# Patient Record
Sex: FEMALE | Race: OTHER | NOT HISPANIC OR LATINO | URBAN - METROPOLITAN AREA
[De-identification: names, ages, dates, MRNs, and addresses within clinical notes are randomized per-mention and may not be internally consistent; named-entity substitution may affect disease eponyms.]

---

## 2020-10-02 ENCOUNTER — INPATIENT (INPATIENT)
Facility: HOSPITAL | Age: 65
LOS: 6 days | Discharge: HOME HEALTH | DRG: 219 | End: 2020-10-09
Attending: THORACIC SURGERY (CARDIOTHORACIC VASCULAR SURGERY) | Admitting: THORACIC SURGERY (CARDIOTHORACIC VASCULAR SURGERY)
Payer: COMMERCIAL

## 2020-10-02 VITALS
OXYGEN SATURATION: 100 % | HEART RATE: 99 BPM | DIASTOLIC BLOOD PRESSURE: 59 MMHG | HEIGHT: 60 IN | SYSTOLIC BLOOD PRESSURE: 121 MMHG | WEIGHT: 134.26 LBS | RESPIRATION RATE: 16 BRPM

## 2020-10-02 LAB
A1C WITH ESTIMATED AVERAGE GLUCOSE RESULT: 6.9 % — HIGH (ref 4–5.6)
ALBUMIN SERPL ELPH-MCNC: 3.5 G/DL — SIGNIFICANT CHANGE UP (ref 3.3–5)
ALP SERPL-CCNC: 94 U/L — SIGNIFICANT CHANGE UP (ref 40–120)
ALT FLD-CCNC: 20 U/L — SIGNIFICANT CHANGE UP (ref 10–45)
ANION GAP SERPL CALC-SCNC: 12 MMOL/L — SIGNIFICANT CHANGE UP (ref 5–17)
APTT BLD: 27.6 SEC — SIGNIFICANT CHANGE UP (ref 27.5–35.5)
AST SERPL-CCNC: 19 U/L — SIGNIFICANT CHANGE UP (ref 10–40)
BASOPHILS # BLD AUTO: 0.03 K/UL — SIGNIFICANT CHANGE UP (ref 0–0.2)
BASOPHILS NFR BLD AUTO: 0.3 % — SIGNIFICANT CHANGE UP (ref 0–2)
BILIRUB SERPL-MCNC: 0.8 MG/DL — SIGNIFICANT CHANGE UP (ref 0.2–1.2)
BLD GP AB SCN SERPL QL: NEGATIVE — SIGNIFICANT CHANGE UP
BUN SERPL-MCNC: 22 MG/DL — SIGNIFICANT CHANGE UP (ref 7–23)
CALCIUM SERPL-MCNC: 9.1 MG/DL — SIGNIFICANT CHANGE UP (ref 8.4–10.5)
CHLORIDE SERPL-SCNC: 102 MMOL/L — SIGNIFICANT CHANGE UP (ref 96–108)
CO2 SERPL-SCNC: 27 MMOL/L — SIGNIFICANT CHANGE UP (ref 22–31)
CREAT SERPL-MCNC: 0.72 MG/DL — SIGNIFICANT CHANGE UP (ref 0.5–1.3)
EOSINOPHIL # BLD AUTO: 0.31 K/UL — SIGNIFICANT CHANGE UP (ref 0–0.5)
EOSINOPHIL NFR BLD AUTO: 3.3 % — SIGNIFICANT CHANGE UP (ref 0–6)
ESTIMATED AVERAGE GLUCOSE: 151 MG/DL — HIGH (ref 68–114)
GLUCOSE BLDC GLUCOMTR-MCNC: 107 MG/DL — HIGH (ref 70–99)
GLUCOSE SERPL-MCNC: 105 MG/DL — HIGH (ref 70–99)
HCT VFR BLD CALC: 35.8 % — SIGNIFICANT CHANGE UP (ref 34.5–45)
HGB BLD-MCNC: 11.7 G/DL — SIGNIFICANT CHANGE UP (ref 11.5–15.5)
IMM GRANULOCYTES NFR BLD AUTO: 0.3 % — SIGNIFICANT CHANGE UP (ref 0–1.5)
INR BLD: 1.09 — SIGNIFICANT CHANGE UP (ref 0.88–1.16)
LYMPHOCYTES # BLD AUTO: 1.82 K/UL — SIGNIFICANT CHANGE UP (ref 1–3.3)
LYMPHOCYTES # BLD AUTO: 19.7 % — SIGNIFICANT CHANGE UP (ref 13–44)
MCHC RBC-ENTMCNC: 29.4 PG — SIGNIFICANT CHANGE UP (ref 27–34)
MCHC RBC-ENTMCNC: 32.7 GM/DL — SIGNIFICANT CHANGE UP (ref 32–36)
MCV RBC AUTO: 89.9 FL — SIGNIFICANT CHANGE UP (ref 80–100)
MONOCYTES # BLD AUTO: 0.89 K/UL — SIGNIFICANT CHANGE UP (ref 0–0.9)
MONOCYTES NFR BLD AUTO: 9.6 % — SIGNIFICANT CHANGE UP (ref 2–14)
NEUTROPHILS # BLD AUTO: 6.18 K/UL — SIGNIFICANT CHANGE UP (ref 1.8–7.4)
NEUTROPHILS NFR BLD AUTO: 66.8 % — SIGNIFICANT CHANGE UP (ref 43–77)
NRBC # BLD: 0 /100 WBCS — SIGNIFICANT CHANGE UP (ref 0–0)
NT-PROBNP SERPL-SCNC: 1361 PG/ML — HIGH (ref 0–300)
PLATELET # BLD AUTO: 247 K/UL — SIGNIFICANT CHANGE UP (ref 150–400)
POTASSIUM SERPL-MCNC: 3.8 MMOL/L — SIGNIFICANT CHANGE UP (ref 3.5–5.3)
POTASSIUM SERPL-SCNC: 3.8 MMOL/L — SIGNIFICANT CHANGE UP (ref 3.5–5.3)
PROT SERPL-MCNC: 7.2 G/DL — SIGNIFICANT CHANGE UP (ref 6–8.3)
PROTHROM AB SERPL-ACNC: 13 SEC — SIGNIFICANT CHANGE UP (ref 10.6–13.6)
RBC # BLD: 3.98 M/UL — SIGNIFICANT CHANGE UP (ref 3.8–5.2)
RBC # FLD: 14.6 % — HIGH (ref 10.3–14.5)
RH IG SCN BLD-IMP: POSITIVE — SIGNIFICANT CHANGE UP
SODIUM SERPL-SCNC: 141 MMOL/L — SIGNIFICANT CHANGE UP (ref 135–145)
TSH SERPL-MCNC: 0.95 UIU/ML — SIGNIFICANT CHANGE UP (ref 0.35–4.94)
WBC # BLD: 9.26 K/UL — SIGNIFICANT CHANGE UP (ref 3.8–10.5)
WBC # FLD AUTO: 9.26 K/UL — SIGNIFICANT CHANGE UP (ref 3.8–10.5)

## 2020-10-02 PROCEDURE — 99223 1ST HOSP IP/OBS HIGH 75: CPT

## 2020-10-02 RX ORDER — ASPIRIN/CALCIUM CARB/MAGNESIUM 324 MG
81 TABLET ORAL DAILY
Refills: 0 | Status: DISCONTINUED | OUTPATIENT
Start: 2020-10-02 | End: 2020-10-05

## 2020-10-02 RX ORDER — POLYETHYLENE GLYCOL 3350 17 G/17G
17 POWDER, FOR SOLUTION ORAL DAILY
Refills: 0 | Status: DISCONTINUED | OUTPATIENT
Start: 2020-10-02 | End: 2020-10-05

## 2020-10-02 RX ORDER — HEPARIN SODIUM 5000 [USP'U]/ML
5000 INJECTION INTRAVENOUS; SUBCUTANEOUS EVERY 8 HOURS
Refills: 0 | Status: DISCONTINUED | OUTPATIENT
Start: 2020-10-02 | End: 2020-10-05

## 2020-10-02 RX ORDER — DEXTROSE 50 % IN WATER 50 %
15 SYRINGE (ML) INTRAVENOUS ONCE
Refills: 0 | Status: DISCONTINUED | OUTPATIENT
Start: 2020-10-02 | End: 2020-10-05

## 2020-10-02 RX ORDER — SODIUM CHLORIDE 9 MG/ML
3 INJECTION INTRAMUSCULAR; INTRAVENOUS; SUBCUTANEOUS EVERY 8 HOURS
Refills: 0 | Status: DISCONTINUED | OUTPATIENT
Start: 2020-10-02 | End: 2020-10-05

## 2020-10-02 RX ORDER — DEXTROSE 50 % IN WATER 50 %
25 SYRINGE (ML) INTRAVENOUS ONCE
Refills: 0 | Status: DISCONTINUED | OUTPATIENT
Start: 2020-10-02 | End: 2020-10-05

## 2020-10-02 RX ORDER — ACETAMINOPHEN 500 MG
650 TABLET ORAL EVERY 6 HOURS
Refills: 0 | Status: DISCONTINUED | OUTPATIENT
Start: 2020-10-02 | End: 2020-10-05

## 2020-10-02 RX ORDER — DEXTROSE 50 % IN WATER 50 %
12.5 SYRINGE (ML) INTRAVENOUS ONCE
Refills: 0 | Status: DISCONTINUED | OUTPATIENT
Start: 2020-10-02 | End: 2020-10-05

## 2020-10-02 RX ORDER — PANTOPRAZOLE SODIUM 20 MG/1
40 TABLET, DELAYED RELEASE ORAL
Refills: 0 | Status: DISCONTINUED | OUTPATIENT
Start: 2020-10-02 | End: 2020-10-05

## 2020-10-02 RX ORDER — SODIUM CHLORIDE 9 MG/ML
1000 INJECTION, SOLUTION INTRAVENOUS
Refills: 0 | Status: DISCONTINUED | OUTPATIENT
Start: 2020-10-02 | End: 2020-10-05

## 2020-10-02 RX ORDER — INSULIN LISPRO 100/ML
VIAL (ML) SUBCUTANEOUS
Refills: 0 | Status: DISCONTINUED | OUTPATIENT
Start: 2020-10-02 | End: 2020-10-05

## 2020-10-02 RX ORDER — GLUCAGON INJECTION, SOLUTION 0.5 MG/.1ML
1 INJECTION, SOLUTION SUBCUTANEOUS ONCE
Refills: 0 | Status: DISCONTINUED | OUTPATIENT
Start: 2020-10-02 | End: 2020-10-05

## 2020-10-02 RX ORDER — ATORVASTATIN CALCIUM 80 MG/1
40 TABLET, FILM COATED ORAL AT BEDTIME
Refills: 0 | Status: DISCONTINUED | OUTPATIENT
Start: 2020-10-02 | End: 2020-10-05

## 2020-10-02 RX ORDER — POTASSIUM CHLORIDE 20 MEQ
20 PACKET (EA) ORAL ONCE
Refills: 0 | Status: COMPLETED | OUTPATIENT
Start: 2020-10-02 | End: 2020-10-02

## 2020-10-02 RX ORDER — METOPROLOL TARTRATE 50 MG
25 TABLET ORAL EVERY 12 HOURS
Refills: 0 | Status: DISCONTINUED | OUTPATIENT
Start: 2020-10-02 | End: 2020-10-05

## 2020-10-02 RX ADMIN — HEPARIN SODIUM 5000 UNIT(S): 5000 INJECTION INTRAVENOUS; SUBCUTANEOUS at 23:17

## 2020-10-02 RX ADMIN — Medication 20 MILLIEQUIVALENT(S): at 23:17

## 2020-10-02 RX ADMIN — SODIUM CHLORIDE 3 MILLILITER(S): 9 INJECTION INTRAMUSCULAR; INTRAVENOUS; SUBCUTANEOUS at 22:54

## 2020-10-02 RX ADMIN — ATORVASTATIN CALCIUM 40 MILLIGRAM(S): 80 TABLET, FILM COATED ORAL at 23:17

## 2020-10-02 NOTE — H&P ADULT - HISTORY OF PRESENT ILLNESS
This is a 68 y/o F with a PMHx of HLD, DM, SVT s/p ablation, cardiomyopathy, paroxysmal VT, non-obstructive CAD, left adrenal mass, multinodular goiter, HFpEF (EF 65-70%) grade 1 diastolic dysfunction, and recent hospital admission for COVID (April 2020). Patient states she went to [facility] on 9/29/20 with complaints of acute SOB. Patient denies doing anything at the time of the SOB and that it came on "suddenly" and she was "gasping for air" with associated mild chest pain. Nothing made her breathing better or worse. Patient was BIBA to  and was found to have CHF exacerbation and was diuresed. ECHO completed on 9/30/20 showing EF 65-70% and moderate to severe MR. 10/1/20 VAL was completed showing MV with severe eccentric jet directed anteriorly. Patient then underwent diagnostic cardiac cath revealing non-obstructive minimal CAD and severe MR. Patient transferred to [facility] under the care of Dr. Orozco for surgical evaluation of mitral valve. Patient currently feels well and denies any CP, palpitations, SOB, wheezing, abd pain, n/v/d/c, fevers or chills.     VITALS ON ARRIVAL:   BP: 130/82  HR: 105 bpm  Tmax: afebrile  RR: 18 breaths/min  O2 sat: 99% on 2lpm via NC

## 2020-10-02 NOTE — H&P ADULT - NSHPSOCIALHISTORY_GEN_ALL_CORE
SMOKING: never smoker  ETOH: none  ILLICIT DRUGS: none  LIVES WITH: daughter  ASSISTIVE DEVICES: none  HOME: 1st floor apartment

## 2020-10-02 NOTE — H&P ADULT - ASSESSMENT
This is a 66 y/o F with a PMHx of HLD, DM, SVT s/p ablation, cardiomyopathy, paroxysmal VT, non-obstructive CAD, left adrenal mass, multinodular goiter, HFpEF (EF 65-70%) grade 1 diastolic dysfunction, and recent hospital admission for COVID (April 2020). Patient states she went to [facility] on 9/29/20 with complaints of acute SOB. Patient denies doing anything at the time of the SOB and that it came on "suddenly" and she was "gasping for air" with associated mild chest pain. Nothing made her breathing better or worse. Patient was BIBA to  and was found to have CHF exacerbation and was diuresed. ECHO completed on 9/30/20 showing EF 65-70% and moderate to severe MR. 10/1/20 VAL was completed showing MV with severe eccentric jet directed anteriorly. Patient then underwent diagnostic cardiac cath revealing non-obstructive minimal CAD and severe MR. Patient transferred to [facility] under the care of Dr. Orozco for surgical evaluation of mitral valve.    Plan:    Neurovascular:   -No Pain, PRN tylenol ordered    Cardiovascular:   -Severe MR: [place] [facility] [place] Pending PST. F/u official plan with Dr. Orozco    -PST ordered: ECHO, carotids, PA/Lateral CXR, labs, PFTs  -[place] [facility] non-obstructive minimal CAD, severe MR  -c/w BB, ASA *of note, patient does not know home mediations, will need to review home medications with daughter when she comes in tomorrow*   -Hemodynamically stable.   -Monitor: BP, HR, tele  -HLD: started atorvastatin 40mg daily   -HFpEF (EF 65-70%): c/w BB, diuresis as appropriate, f/u CXR and monitor I/O for fluid status      Respiratory:   -Previous COVID infection (resolved), PENDING repeat COVID testing while inpatient, f/u result   -Oxygenating well on 2lpm via NC (99%) -- no home O2 use   -Encourage continued use of IS 10x/hr and frequent ambulation  -CXR: pending     GI:  -GI PPX: pantoprazole 40mg daily   -PO Diet: DASH/TLC   -Bowel Regimen: miralax PRN     Renal / :  -Continue to monitor renal function: BUN/Cr pending   -Monitor I/O's daily     Endocrine:    -No hx of thyroid dx  -A1c: pending   -TSH: pending   -hx of "preDM" per patient, monitor glucose readings and start appropriate long-acting/pre-meal insulin as needed     Hematologic:  -CBC: H/H- pending   -Coagulation Panel.    ID:  -Temperature: afebrile   -CBC: WBC- pending   -Continue to observe for SIRS/Sepsis Syndrome.    Prophylaxis:  -DVT prophylaxis with 5000 SubQ Heparin q8h.  -Continue with SCD's b/l while patient is at rest     Disposition:  -PST in anticipation for possible OR procedure, f/u plan with Dr. Orozco

## 2020-10-02 NOTE — H&P ADULT - NSHPREVIEWOFSYSTEMS_GEN_ALL_CORE
Review of Systems  CONSTITUTIONAL:  Denies Fevers / chills, sweats, fatigue, weight loss, weight gain                                      NEURO:  Denies parathesias, seizures, syncope, confusion                                                                                EYES:  Denies Blurry vision, discharge, pain, loss of vision                                                                                    ENMT:  Denies Difficulty hearing, vertigo, dysphagia, epistaxis, recent dental work                                       CV:  Denies Chest pain, palpitations, HE, orthopnea                                                                                          RESPIRATORY:  Denies Wheezing, SOB, cough / sputum, hemoptysis                                                                GI:  Denies Nausea, vommiting, diarrhea, constipation, melena, difficulty swallowing                                               : Denies Hematuria, dysuria, urgency, incontinence                                                                                         MUSKULOSKELETAL:  Denies arthritis, joint swelling, muscle weakness                                                             SKIN/BREAST:  Denies rash, itching, darcie loss, masses                                                                                            PSYCH:  Denies depresion, anxiety, suicidal ideation                                                                                               HEME/LYMPH:  Denies bruises easily, enlarged lymph nodes, tender lymph nodes                                        ENDOCRINE:  Denies cold intolerance, heat intolerance, polydipsia

## 2020-10-02 NOTE — H&P ADULT - NSICDXPASTMEDICALHX_GEN_ALL_CORE_FT
PAST MEDICAL HISTORY:  Essential hypertension HTN (hypertension)    Hyperlipidemia Hyperlipidemia    Other specified cardiac dysrhythmias SVT (supraventricular tachycardia) s/p ablation    Palpitations Palpitations    Premature beats PVC (premature ventricular contraction)

## 2020-10-02 NOTE — H&P ADULT - NSHPPHYSICALEXAM_GEN_ALL_CORE
PHYSICAL EXAM:  General: well appearing resting in bed in NAD   HEENT: normocephalic, atraumatic, PERRL   Neurological: AOx3. Motor skills grossly intact  Cardiovascular: Normal S1/S2. Regular rate/rhythm. +systolic murmur   Respiratory: Lungs CTA bilaterally. No wheezing or rales  Gastrointestinal: +BS in all 4 quadrants. Non-distended. Soft. Non-tender  Extremities: Strength 5/5 b/l upper/lower extremities. Sensation grossly intact upper/lower extremities. Trace LE edema. No calf tenderness.  Vascular: Radial 2+bilaterally, DP 2+ b/l

## 2020-10-03 LAB
ALBUMIN SERPL ELPH-MCNC: 3.7 G/DL — SIGNIFICANT CHANGE UP (ref 3.3–5)
ALP SERPL-CCNC: 98 U/L — SIGNIFICANT CHANGE UP (ref 40–120)
ALT FLD-CCNC: 18 U/L — SIGNIFICANT CHANGE UP (ref 10–45)
ANION GAP SERPL CALC-SCNC: 12 MMOL/L — SIGNIFICANT CHANGE UP (ref 5–17)
APPEARANCE UR: CLEAR — SIGNIFICANT CHANGE UP
AST SERPL-CCNC: 17 U/L — SIGNIFICANT CHANGE UP (ref 10–40)
BASOPHILS # BLD AUTO: 0.05 K/UL — SIGNIFICANT CHANGE UP (ref 0–0.2)
BASOPHILS NFR BLD AUTO: 0.5 % — SIGNIFICANT CHANGE UP (ref 0–2)
BILIRUB SERPL-MCNC: 1.1 MG/DL — SIGNIFICANT CHANGE UP (ref 0.2–1.2)
BILIRUB UR-MCNC: NEGATIVE — SIGNIFICANT CHANGE UP
BUN SERPL-MCNC: 21 MG/DL — SIGNIFICANT CHANGE UP (ref 7–23)
CALCIUM SERPL-MCNC: 9.6 MG/DL — SIGNIFICANT CHANGE UP (ref 8.4–10.5)
CHLORIDE SERPL-SCNC: 102 MMOL/L — SIGNIFICANT CHANGE UP (ref 96–108)
CK MB CFR SERPL CALC: 1.9 NG/ML — SIGNIFICANT CHANGE UP (ref 0–6.7)
CK SERPL-CCNC: 120 U/L — SIGNIFICANT CHANGE UP (ref 25–170)
CO2 SERPL-SCNC: 27 MMOL/L — SIGNIFICANT CHANGE UP (ref 22–31)
COLOR SPEC: YELLOW — SIGNIFICANT CHANGE UP
CREAT SERPL-MCNC: 0.72 MG/DL — SIGNIFICANT CHANGE UP (ref 0.5–1.3)
DIFF PNL FLD: NEGATIVE — SIGNIFICANT CHANGE UP
EOSINOPHIL # BLD AUTO: 0.34 K/UL — SIGNIFICANT CHANGE UP (ref 0–0.5)
EOSINOPHIL NFR BLD AUTO: 3.6 % — SIGNIFICANT CHANGE UP (ref 0–6)
GLUCOSE BLDC GLUCOMTR-MCNC: 116 MG/DL — HIGH (ref 70–99)
GLUCOSE BLDC GLUCOMTR-MCNC: 126 MG/DL — HIGH (ref 70–99)
GLUCOSE BLDC GLUCOMTR-MCNC: 159 MG/DL — HIGH (ref 70–99)
GLUCOSE SERPL-MCNC: 121 MG/DL — HIGH (ref 70–99)
GLUCOSE UR QL: NEGATIVE — SIGNIFICANT CHANGE UP
HCT VFR BLD CALC: 38.3 % — SIGNIFICANT CHANGE UP (ref 34.5–45)
HCV AB S/CO SERPL IA: 0.12 S/CO — SIGNIFICANT CHANGE UP
HCV AB SERPL-IMP: SIGNIFICANT CHANGE UP
HGB BLD-MCNC: 12.2 G/DL — SIGNIFICANT CHANGE UP (ref 11.5–15.5)
IMM GRANULOCYTES NFR BLD AUTO: 0.3 % — SIGNIFICANT CHANGE UP (ref 0–1.5)
KETONES UR-MCNC: NEGATIVE — SIGNIFICANT CHANGE UP
LEUKOCYTE ESTERASE UR-ACNC: NEGATIVE — SIGNIFICANT CHANGE UP
LYMPHOCYTES # BLD AUTO: 2.29 K/UL — SIGNIFICANT CHANGE UP (ref 1–3.3)
LYMPHOCYTES # BLD AUTO: 24.3 % — SIGNIFICANT CHANGE UP (ref 13–44)
MCHC RBC-ENTMCNC: 29.2 PG — SIGNIFICANT CHANGE UP (ref 27–34)
MCHC RBC-ENTMCNC: 31.9 GM/DL — LOW (ref 32–36)
MCV RBC AUTO: 91.6 FL — SIGNIFICANT CHANGE UP (ref 80–100)
MONOCYTES # BLD AUTO: 0.66 K/UL — SIGNIFICANT CHANGE UP (ref 0–0.9)
MONOCYTES NFR BLD AUTO: 7 % — SIGNIFICANT CHANGE UP (ref 2–14)
NEUTROPHILS # BLD AUTO: 6.07 K/UL — SIGNIFICANT CHANGE UP (ref 1.8–7.4)
NEUTROPHILS NFR BLD AUTO: 64.3 % — SIGNIFICANT CHANGE UP (ref 43–77)
NITRITE UR-MCNC: NEGATIVE — SIGNIFICANT CHANGE UP
NRBC # BLD: 0 /100 WBCS — SIGNIFICANT CHANGE UP (ref 0–0)
PH UR: 6 — SIGNIFICANT CHANGE UP (ref 5–8)
PLATELET # BLD AUTO: 242 K/UL — SIGNIFICANT CHANGE UP (ref 150–400)
POTASSIUM SERPL-MCNC: 3.8 MMOL/L — SIGNIFICANT CHANGE UP (ref 3.5–5.3)
POTASSIUM SERPL-SCNC: 3.8 MMOL/L — SIGNIFICANT CHANGE UP (ref 3.5–5.3)
PROT SERPL-MCNC: 7.2 G/DL — SIGNIFICANT CHANGE UP (ref 6–8.3)
PROT UR-MCNC: ABNORMAL MG/DL
RBC # BLD: 4.18 M/UL — SIGNIFICANT CHANGE UP (ref 3.8–5.2)
RBC # FLD: 15.1 % — HIGH (ref 10.3–14.5)
SARS-COV-2 RNA SPEC QL NAA+PROBE: SIGNIFICANT CHANGE UP
SODIUM SERPL-SCNC: 141 MMOL/L — SIGNIFICANT CHANGE UP (ref 135–145)
SP GR SPEC: 1.02 — SIGNIFICANT CHANGE UP (ref 1–1.03)
TROPONIN T SERPL-MCNC: 0.02 NG/ML — HIGH (ref 0–0.01)
TROPONIN T SERPL-MCNC: 0.02 NG/ML — HIGH (ref 0–0.01)
TROPONIN T SERPL-MCNC: <0.01 NG/ML — SIGNIFICANT CHANGE UP (ref 0–0.01)
UROBILINOGEN FLD QL: 0.2 E.U./DL — SIGNIFICANT CHANGE UP
WBC # BLD: 9.44 K/UL — SIGNIFICANT CHANGE UP (ref 3.8–10.5)
WBC # FLD AUTO: 9.44 K/UL — SIGNIFICANT CHANGE UP (ref 3.8–10.5)

## 2020-10-03 PROCEDURE — 93880 EXTRACRANIAL BILAT STUDY: CPT | Mod: 26

## 2020-10-03 PROCEDURE — 71045 X-RAY EXAM CHEST 1 VIEW: CPT | Mod: 26,76

## 2020-10-03 PROCEDURE — 93306 TTE W/DOPPLER COMPLETE: CPT | Mod: 26

## 2020-10-03 RX ORDER — POTASSIUM CHLORIDE 20 MEQ
20 PACKET (EA) ORAL ONCE
Refills: 0 | Status: COMPLETED | OUTPATIENT
Start: 2020-10-03 | End: 2020-10-03

## 2020-10-03 RX ORDER — ANASTROZOLE 1 MG/1
1 TABLET ORAL
Qty: 0 | Refills: 0 | DISCHARGE

## 2020-10-03 RX ORDER — FUROSEMIDE 40 MG
20 TABLET ORAL DAILY
Refills: 0 | Status: DISCONTINUED | OUTPATIENT
Start: 2020-10-03 | End: 2020-10-05

## 2020-10-03 RX ORDER — POTASSIUM CHLORIDE 20 MEQ
10 PACKET (EA) ORAL DAILY
Refills: 0 | Status: DISCONTINUED | OUTPATIENT
Start: 2020-10-03 | End: 2020-10-05

## 2020-10-03 RX ADMIN — Medication 10 MILLIEQUIVALENT(S): at 12:04

## 2020-10-03 RX ADMIN — HEPARIN SODIUM 5000 UNIT(S): 5000 INJECTION INTRAVENOUS; SUBCUTANEOUS at 05:19

## 2020-10-03 RX ADMIN — Medication 81 MILLIGRAM(S): at 12:05

## 2020-10-03 RX ADMIN — HEPARIN SODIUM 5000 UNIT(S): 5000 INJECTION INTRAVENOUS; SUBCUTANEOUS at 22:47

## 2020-10-03 RX ADMIN — ATORVASTATIN CALCIUM 40 MILLIGRAM(S): 80 TABLET, FILM COATED ORAL at 22:47

## 2020-10-03 RX ADMIN — HEPARIN SODIUM 5000 UNIT(S): 5000 INJECTION INTRAVENOUS; SUBCUTANEOUS at 13:45

## 2020-10-03 RX ADMIN — PANTOPRAZOLE SODIUM 40 MILLIGRAM(S): 20 TABLET, DELAYED RELEASE ORAL at 05:19

## 2020-10-03 RX ADMIN — Medication 25 MILLIGRAM(S): at 05:19

## 2020-10-03 RX ADMIN — SODIUM CHLORIDE 3 MILLILITER(S): 9 INJECTION INTRAMUSCULAR; INTRAVENOUS; SUBCUTANEOUS at 13:46

## 2020-10-03 RX ADMIN — Medication 20 MILLIGRAM(S): at 05:19

## 2020-10-03 RX ADMIN — Medication 20 MILLIEQUIVALENT(S): at 08:55

## 2020-10-03 RX ADMIN — SODIUM CHLORIDE 3 MILLILITER(S): 9 INJECTION INTRAMUSCULAR; INTRAVENOUS; SUBCUTANEOUS at 05:59

## 2020-10-03 RX ADMIN — Medication 25 MILLIGRAM(S): at 17:14

## 2020-10-03 RX ADMIN — SODIUM CHLORIDE 3 MILLILITER(S): 9 INJECTION INTRAMUSCULAR; INTRAVENOUS; SUBCUTANEOUS at 22:00

## 2020-10-03 NOTE — PROGRESS NOTE ADULT - ASSESSMENT
This is a 66 y/o F with a PMHx of HLD, DM, SVT s/p ablation, cardiomyopathy, paroxysmal VT, non-obstructive CAD, left adrenal mass, multinodular goiter, HFpEF (EF 65-70%) grade 1 diastolic dysfunction, and recent hospital admission for COVID (April 2020). Patient states she went to [facility] on 9/29/20 with complaints of acute SOB. Patient denies doing anything at the time of the SOB and that it came on "suddenly" and she was "gasping for air" with associated mild chest pain. Nothing made her breathing better or worse. Patient was BIBA to  and was found to have CHF exacerbation and was diuresed. ECHO completed on 9/30/20 showing EF 65-70% and moderate to severe MR. 10/1/20 VAL was completed showing MV with severe eccentric jet directed anteriorly. Patient then underwent diagnostic cardiac cath revealing non-obstructive minimal CAD and severe MR. Patient transferred to [facility] under the care of Dr. Orozco for surgical evaluation of mitral valve. Patient pending PST in anticipation for OR likely early next week.    Plan:  Neurovascular:   -No Pain, PRN tylenol ordered    Cardiovascular:   -Severe MR: transferred from [facility] for surgical evaluation. Pending PST. F/u official plan with Dr. Orozco    -PST ordered: ECHO, carotids, PA/Lateral CXR, labs, PFTs  -Cardiac cath at [facility]: non-obstructive minimal CAD, severe MR  -c/w BB, ASA *of note, patient does not know home mediations, will need to review home medications with daughter when she comes in*  -Hemodynamically stable.   -Monitor: BP, HR, tele  -HLD: started atorvastatin 40mg daily   -HFpEF (EF 65-70%): c/w BB, lasix PO    Respiratory:   -COVID negative, previously COVID+ in April 2020   -Oxygenating well on 2lpm via NC (99%) -- no home O2 use   -Encourage continued use of IS 10x/hr and frequent ambulation  -CXR: no acute pathology, no PTX     GI:  -GI PPX: pantoprazole 40mg daily   -PO Diet: DASH/TLC   -Bowel Regimen: miralax PRN     Renal / :  -Continue to monitor renal function: BUN/Cr 21/0.72  -Monitor I/O's daily     Endocrine:    -No hx of thyroid dx  -A1c: 6.9  -TSH: 0.95  -c/w RISS     Hematologic:  -CBC: H/H- 12/38  -Coagulation Panel    ID:  -Temperature: afebrile   -CBC: WBC- 9.4  -Continue to observe for SIRS/Sepsis Syndrome.    Prophylaxis:  -DVT prophylaxis with 5000 SubQ Heparin q8h.  -Continue with SCD's b/l while patient is at rest     Disposition:  -PST in anticipation for OR, early next week This is a 66 y/o F with a PMHx of HLD, DM, SVT s/p ablation, cardiomyopathy, paroxysmal VT, non-obstructive CAD, left adrenal mass, multinodular goiter, HFpEF (EF 65-70%) grade 1 diastolic dysfunction, and recent hospital admission for COVID (April 2020). Patient states she went to [facility] on 9/29/20 with complaints of acute SOB. Patient denies doing anything at the time of the SOB and that it came on "suddenly" and she was "gasping for air" with associated mild chest pain. Nothing made her breathing better or worse. Patient was BIBA to  and was found to have CHF exacerbation and was diuresed. ECHO completed on 9/30/20 showing EF 65-70% and moderate to severe MR. 10/1/20 VAL was completed showing MV with severe eccentric jet directed anteriorly. Patient then underwent diagnostic cardiac cath revealing non-obstructive minimal CAD and severe MR. Patient transferred to [facility] under the care of Dr. Orozco for surgical evaluation of mitral valve. Patient pending PST in anticipation for OR likely early next week.    Plan:  Neurovascular:   -No Pain, PRN tylenol ordered    Cardiovascular:   -Severe MR: [place] [facility] [place]. Pending PST. F/u official plan with Dr. Orozco    -PST ordered: ECHO, carotids, PA/Lateral CXR, labs, PFTs  -[place] [facility] non-obstructive minimal CAD, severe MR  -c/w BB, ASA *of note, patient does not know home mediations, will need to review home medications with daughter when she comes in*  -hx of SVT s/p ablation   -Hemodynamically stable.   -Monitor: BP, HR, tele  -HLD: started atorvastatin 40mg daily   -HFpEF (EF 65-70%): c/w BB, lasix PO    Respiratory:   -COVID negative, previously COVID+ in April 2020   -Oxygenating well on 2lpm via NC (99%) -- no home O2 use   -Encourage continued use of IS 10x/hr and frequent ambulation  -CXR: no acute pathology, no PTX     GI:  -GI PPX: pantoprazole 40mg daily   -PO Diet: DASH/TLC   -Bowel Regimen: miralax PRN     Renal / :  -Continue to monitor renal function: BUN/Cr 21/0.72  -Monitor I/O's daily     Endocrine:    -No hx of thyroid dx  -A1c: 6.9  -TSH: 0.95  -c/w RISS     Hematologic:  -CBC: H/H- 12/38  -Coagulation Panel    ID:  -Temperature: afebrile   -CBC: WBC- 9.4  -Continue to observe for SIRS/Sepsis Syndrome.    Prophylaxis:  -DVT prophylaxis with 5000 SubQ Heparin q8h.  -Continue with SCD's b/l while patient is at rest     Disposition:  -PST in anticipation for OR, early next week

## 2020-10-03 NOTE — PROVIDER CONTACT NOTE (OTHER) - ACTION/TREATMENT ORDERED:
CT surgery made aware, metoprolol given, per CT surgery call back if HR does not improve in 30 minutes post dose. Pt verbalized understanding of plan of care

## 2020-10-03 NOTE — PROGRESS NOTE ADULT - SUBJECTIVE AND OBJECTIVE BOX
`Patient discussed on morning rounds with Dr. Orozco     Operation / Date: Severe MR    SUBJECTIVE ASSESSMENT:  Patient is feeling well, no complaints this morning. Breathing is much improved and no longer requiring supplemental oxygen. Eating/ drinking well. Denies any CP, palpitations, SOB, wheezing, abd pain, n/v/d/c, fevers or chills.     Vital Signs Last 24 Hrs  T(C): 36.6 (03 Oct 2020 09:29), Max: 37 (03 Oct 2020 06:20)  T(F): 97.9 (03 Oct 2020 09:29), Max: 98.6 (03 Oct 2020 06:20)  HR: 100 (03 Oct 2020 12:02) (95 - 130)  BP: 102/54 (03 Oct 2020 12:02) (102/54 - 123/60)  BP(mean): 74 (03 Oct 2020 12:02) (71 - 74)  RR: 18 (03 Oct 2020 12:02) (16 - 20)  SpO2: 97% (03 Oct 2020 12:02) (96% - 100%)  I&O's Detail    02 Oct 2020 07:01  -  03 Oct 2020 07:00  --------------------------------------------------------  IN:  Total IN: 0 mL    OUT:    Voided (mL): 300 mL  Total OUT: 300 mL    Total NET: -300 mL    CHEST TUBE:  No.  FAWAD DRAIN:  No.  EPICARDIAL WIRES: No.  TIE DOWNS: No.  PERAZA: No.    PHYSICAL EXAM:  General: well appearing sitting in bed in NAD   Neurological: AOx3. Motor skills grossly intact  Cardiovascular: Normal S1/S2. Regular rate/rhythm. No murmurs  Respiratory: Lungs CTA bilaterally. No wheezing or rales  Gastrointestinal: +BS in all 4 quadrants. Non-distended. Soft. Non-tender  Extremities: Strength 5/5 b/l upper/lower extremities. Sensation grossly intact upper/lower extremities. No edema. No calf tenderness.  Vascular: Radial 2+bilaterally, DP 2+ b/l      LABS:                        12.2   9.44  )-----------( 242      ( 03 Oct 2020 05:54 )             38.3       COUMADIN: No.    PT/INR - ( 02 Oct 2020 21:36 )   PT: 13.0 sec;   INR: 1.09     PTT - ( 02 Oct 2020 21:36 )  PTT:27.6 sec    10-03    141  |  102  |  21  ----------------------------<  121<H>  3.8   |  27  |  0.72    Ca    9.6      03 Oct 2020 05:54    TPro  7.2  /  Alb  3.7  /  TBili  1.1  /  DBili  x   /  AST  17  /  ALT  18  /  AlkPhos  98  10-03      Urinalysis Basic - ( 02 Oct 2020 23:40 )    Color: Yellow / Appearance: Clear / S.025 / pH: x  Gluc: x / Ketone: NEGATIVE  / Bili: Negative / Urobili: 0.2 E.U./dL   Blood: x / Protein: Trace mg/dL / Nitrite: NEGATIVE   Leuk Esterase: NEGATIVE / RBC: < 5 /HPF / WBC < 5 /HPF   Sq Epi: x / Non Sq Epi: 0-5 /HPF / Bacteria: Present /HPF        MEDICATIONS  (STANDING):  aspirin  chewable 81 milliGRAM(s) Oral daily  atorvastatin 40 milliGRAM(s) Oral at bedtime  furosemide    Tablet 20 milliGRAM(s) Oral daily  heparin   Injectable 5000 Unit(s) SubCutaneous every 8 hours  insulin lispro (HumaLOG) corrective regimen sliding scale   SubCutaneous Before meals and at bedtime  metoprolol tartrate 25 milliGRAM(s) Oral every 12 hours  pantoprazole    Tablet 40 milliGRAM(s) Oral before breakfast  potassium chloride    Tablet ER 10 milliEquivalent(s) Oral daily  sodium chloride 0.9% lock flush 3 milliLiter(s) IV Push every 8 hours    MEDICATIONS  (PRN):  acetaminophen   Tablet .. 650 milliGRAM(s) Oral every 6 hours PRN Temp greater or equal to 38C (100.4F), Mild Pain (1 - 3)  dextrose 40% Gel 15 Gram(s) Oral once PRN Blood Glucose LESS THAN 70 milliGRAM(s)/deciliter  glucagon  Injectable 1 milliGRAM(s) IntraMuscular once PRN Glucose LESS THAN 70 milligrams/deciliter  polyethylene glycol 3350 17 Gram(s) Oral daily PRN Constipation        RADIOLOGY & ADDITIONAL TESTS:  < from: Xray Chest 1 View-PORTABLE IMMEDIATE (Xray Chest 1 View-PORTABLE IMMEDIATE .) (10.03.20 @ 01:22) >  IMPRESSION: No acute infiltrates  < end of copied text >

## 2020-10-03 NOTE — PROVIDER CONTACT NOTE (OTHER) - ASSESSMENT
pt alert/oriented x4, /59, Sp02 100% on room air,  ST. Pt denies chest pain/palpitations/sob. Pt states she only feels "hot". Pt afebrile

## 2020-10-04 ENCOUNTER — TRANSCRIPTION ENCOUNTER (OUTPATIENT)
Age: 65
End: 2020-10-04

## 2020-10-04 LAB
ANION GAP SERPL CALC-SCNC: 14 MMOL/L — SIGNIFICANT CHANGE UP (ref 5–17)
BLD GP AB SCN SERPL QL: NEGATIVE — SIGNIFICANT CHANGE UP
BUN SERPL-MCNC: 20 MG/DL — SIGNIFICANT CHANGE UP (ref 7–23)
CALCIUM SERPL-MCNC: 9.1 MG/DL — SIGNIFICANT CHANGE UP (ref 8.4–10.5)
CHLORIDE SERPL-SCNC: 103 MMOL/L — SIGNIFICANT CHANGE UP (ref 96–108)
CO2 SERPL-SCNC: 24 MMOL/L — SIGNIFICANT CHANGE UP (ref 22–31)
CREAT SERPL-MCNC: 0.8 MG/DL — SIGNIFICANT CHANGE UP (ref 0.5–1.3)
GLUCOSE BLDC GLUCOMTR-MCNC: 111 MG/DL — HIGH (ref 70–99)
GLUCOSE BLDC GLUCOMTR-MCNC: 113 MG/DL — HIGH (ref 70–99)
GLUCOSE BLDC GLUCOMTR-MCNC: 117 MG/DL — HIGH (ref 70–99)
GLUCOSE BLDC GLUCOMTR-MCNC: 123 MG/DL — HIGH (ref 70–99)
GLUCOSE BLDC GLUCOMTR-MCNC: 152 MG/DL — HIGH (ref 70–99)
GLUCOSE SERPL-MCNC: 141 MG/DL — HIGH (ref 70–99)
HCT VFR BLD CALC: 39.1 % — SIGNIFICANT CHANGE UP (ref 34.5–45)
HGB BLD-MCNC: 12.6 G/DL — SIGNIFICANT CHANGE UP (ref 11.5–15.5)
MAGNESIUM SERPL-MCNC: 2.1 MG/DL — SIGNIFICANT CHANGE UP (ref 1.6–2.6)
MCHC RBC-ENTMCNC: 29.4 PG — SIGNIFICANT CHANGE UP (ref 27–34)
MCHC RBC-ENTMCNC: 32.2 GM/DL — SIGNIFICANT CHANGE UP (ref 32–36)
MCV RBC AUTO: 91.1 FL — SIGNIFICANT CHANGE UP (ref 80–100)
NRBC # BLD: 0 /100 WBCS — SIGNIFICANT CHANGE UP (ref 0–0)
PLATELET # BLD AUTO: 282 K/UL — SIGNIFICANT CHANGE UP (ref 150–400)
POTASSIUM SERPL-MCNC: 4 MMOL/L — SIGNIFICANT CHANGE UP (ref 3.5–5.3)
POTASSIUM SERPL-SCNC: 4 MMOL/L — SIGNIFICANT CHANGE UP (ref 3.5–5.3)
RBC # BLD: 4.29 M/UL — SIGNIFICANT CHANGE UP (ref 3.8–5.2)
RBC # FLD: 15.1 % — HIGH (ref 10.3–14.5)
RH IG SCN BLD-IMP: POSITIVE — SIGNIFICANT CHANGE UP
SARS-COV-2 IGG SERPL QL IA: POSITIVE
SARS-COV-2 IGM SERPL IA-ACNC: 126 INDEX — HIGH
SODIUM SERPL-SCNC: 141 MMOL/L — SIGNIFICANT CHANGE UP (ref 135–145)
WBC # BLD: 7.42 K/UL — SIGNIFICANT CHANGE UP (ref 3.8–10.5)
WBC # FLD AUTO: 7.42 K/UL — SIGNIFICANT CHANGE UP (ref 3.8–10.5)

## 2020-10-04 RX ORDER — CHLORHEXIDINE GLUCONATE 213 G/1000ML
15 SOLUTION TOPICAL ONCE
Refills: 0 | Status: COMPLETED | OUTPATIENT
Start: 2020-10-04 | End: 2020-10-05

## 2020-10-04 RX ORDER — CHLORHEXIDINE GLUCONATE 213 G/1000ML
1 SOLUTION TOPICAL ONCE
Refills: 0 | Status: COMPLETED | OUTPATIENT
Start: 2020-10-04 | End: 2020-10-04

## 2020-10-04 RX ORDER — CHLORHEXIDINE GLUCONATE 213 G/1000ML
1 SOLUTION TOPICAL ONCE
Refills: 0 | Status: COMPLETED | OUTPATIENT
Start: 2020-10-05 | End: 2020-10-05

## 2020-10-04 RX ADMIN — Medication 25 MILLIGRAM(S): at 18:02

## 2020-10-04 RX ADMIN — CHLORHEXIDINE GLUCONATE 1 APPLICATION(S): 213 SOLUTION TOPICAL at 19:38

## 2020-10-04 RX ADMIN — Medication 10 MILLIEQUIVALENT(S): at 11:48

## 2020-10-04 RX ADMIN — HEPARIN SODIUM 5000 UNIT(S): 5000 INJECTION INTRAVENOUS; SUBCUTANEOUS at 22:46

## 2020-10-04 RX ADMIN — Medication 20 MILLIGRAM(S): at 05:32

## 2020-10-04 RX ADMIN — Medication 25 MILLIGRAM(S): at 05:32

## 2020-10-04 RX ADMIN — SODIUM CHLORIDE 3 MILLILITER(S): 9 INJECTION INTRAMUSCULAR; INTRAVENOUS; SUBCUTANEOUS at 21:10

## 2020-10-04 RX ADMIN — PANTOPRAZOLE SODIUM 40 MILLIGRAM(S): 20 TABLET, DELAYED RELEASE ORAL at 05:32

## 2020-10-04 RX ADMIN — HEPARIN SODIUM 5000 UNIT(S): 5000 INJECTION INTRAVENOUS; SUBCUTANEOUS at 16:06

## 2020-10-04 RX ADMIN — HEPARIN SODIUM 5000 UNIT(S): 5000 INJECTION INTRAVENOUS; SUBCUTANEOUS at 05:32

## 2020-10-04 RX ADMIN — SODIUM CHLORIDE 3 MILLILITER(S): 9 INJECTION INTRAMUSCULAR; INTRAVENOUS; SUBCUTANEOUS at 05:32

## 2020-10-04 RX ADMIN — ATORVASTATIN CALCIUM 40 MILLIGRAM(S): 80 TABLET, FILM COATED ORAL at 22:47

## 2020-10-04 RX ADMIN — Medication 81 MILLIGRAM(S): at 11:49

## 2020-10-04 RX ADMIN — SODIUM CHLORIDE 3 MILLILITER(S): 9 INJECTION INTRAMUSCULAR; INTRAVENOUS; SUBCUTANEOUS at 16:00

## 2020-10-04 RX ADMIN — CHLORHEXIDINE GLUCONATE 1 APPLICATION(S): 213 SOLUTION TOPICAL at 23:27

## 2020-10-04 RX ADMIN — Medication 1: at 18:02

## 2020-10-04 NOTE — PROGRESS NOTE ADULT - SUBJECTIVE AND OBJECTIVE BOX
Planned Date of Surgery: 10/5/20                                                                                                                 Surgeon: Dr. Orozco    Procedure: MVR    HPI:  This is a 68 y/o F with a PMHx of HLD, DM, SVT s/p ablation, cardiomyopathy, paroxysmal VT, non-obstructive CAD, left adrenal mass, multinodular goiter, HFpEF (EF 65-70%) grade 1 diastolic dysfunction, and recent hospital admission for COVID (2020). Patient states she went to [facility] on 20 with complaints of acute SOB. Patient denies doing anything at the time of the SOB and that it came on "suddenly" and she was "gasping for air" with associated mild chest pain. Nothing made her breathing better or worse. Patient was BIBA to  and was found to have CHF exacerbation and was diuresed. ECHO completed on 20 showing EF 65-70% and moderate to severe MR. 10/1/20 VAL was completed showing MV with severe eccentric jet directed anteriorly. Patient then underwent diagnostic cardiac cath revealing non-obstructive minimal CAD and severe MR. Patient transferred to [facility] under the care of Dr. Orozco for surgical evaluation of mitral valve. She was deemed a good surgical candidate and is now planned for MVR on 10/5/20.     PAST MEDICAL & SURGICAL HISTORY:  Essential hypertension  HTN (hypertension)    Other specified cardiac dysrhythmias  SVT (supraventricular tachycardia) s/p ablation    Hyperlipidemia  Hyperlipidemia    Palpitations  Palpitations    Premature beats  PVC (premature ventricular contraction)        No Known Allergies      Physical Exam  T(C): 36.3 (10-04-20 @ 09:11), Max: 36.9 (10-03-20 @ 21:51)  HR: 92 (10-04-20 @ 08:41) (92 - 108)  BP: 90/57 (10-04-20 @ 08:41) (90/57 - 131/84)  RR: 17 (10-04-20 @ 08:41) (17 - 18)  SpO2: 98% (10-04-20 @ 08:41) (96% - 99%)  CONSTITUTIONAL: Well appearing in NAD assessed laying comfortably in bed   NEURO: A&OX3. No focal deficits noted, moving bilateral upper and lower extremities                    CV: RRR, +murmur, no rubs, gallops  RESPIRATORY: Clear to auscultation bilateral posterior lung fields, no wheezes, rales, rhonchi   GI: +BS, NT/ND  MUSKULOSKELETAL: No peripheral edema or calf tenderness. Full strength and ROM bilateral upper and lower extremities   VASCULAR: Bilateral distal pulses 2+  INCISIONS: None      MEDICATIONS  (STANDING):  aspirin  chewable 81 milliGRAM(s) Oral daily  atorvastatin 40 milliGRAM(s) Oral at bedtime  dextrose 5%. 1000 milliLiter(s) (50 mL/Hr) IV Continuous <Continuous>  dextrose 50% Injectable 12.5 Gram(s) IV Push once  dextrose 50% Injectable 25 Gram(s) IV Push once  dextrose 50% Injectable 25 Gram(s) IV Push once  furosemide    Tablet 20 milliGRAM(s) Oral daily  heparin   Injectable 5000 Unit(s) SubCutaneous every 8 hours  insulin lispro (HumaLOG) corrective regimen sliding scale   SubCutaneous Before meals and at bedtime  metoprolol tartrate 25 milliGRAM(s) Oral every 12 hours  pantoprazole    Tablet 40 milliGRAM(s) Oral before breakfast  potassium chloride    Tablet ER 10 milliEquivalent(s) Oral daily  sodium chloride 0.9% lock flush 3 milliLiter(s) IV Push every 8 hours    MEDICATIONS  (PRN):  acetaminophen   Tablet .. 650 milliGRAM(s) Oral every 6 hours PRN Temp greater or equal to 38C (100.4F), Mild Pain (1 - 3)  dextrose 40% Gel 15 Gram(s) Oral once PRN Blood Glucose LESS THAN 70 milliGRAM(s)/deciliter  glucagon  Injectable 1 milliGRAM(s) IntraMuscular once PRN Glucose LESS THAN 70 milligrams/deciliter  polyethylene glycol 3350 17 Gram(s) Oral daily PRN Constipation      On Beta Blocker? YES     Labs:                        12.6   7.42  )-----------( 282      ( 04 Oct 2020 07:36 )             39.1     10-    141  |  103  |  20  ----------------------------<  141<H>  4.0   |  24  |  0.80    Ca    9.1      04 Oct 2020 07:35  Mg     2.1     10-04    TPro  7.2  /  Alb  3.7  /  TBili  1.1  /  DBili  x   /  AST  17  /  ALT  18  /  AlkPhos  98  10-03    PT/INR - ( 02 Oct 2020 21:36 )   PT: 13.0 sec;   INR: 1.09          PTT - ( 02 Oct 2020 21:36 )  PTT:27.6 sec  Urinalysis Basic - ( 02 Oct 2020 23:40 )    Color: Yellow / Appearance: Clear / S.025 / pH: x  Gluc: x / Ketone: NEGATIVE  / Bili: Negative / Urobili: 0.2 E.U./dL   Blood: x / Protein: Trace mg/dL / Nitrite: NEGATIVE   Leuk Esterase: NEGATIVE / RBC: < 5 /HPF / WBC < 5 /HPF   Sq Epi: x / Non Sq Epi: 0-5 /HPF / Bacteria: Present /HPF      ABO Interpretation: A (10-04-20 @ 07:39)      CARDIAC MARKERS ( 03 Oct 2020 17:33 )  x     / <0.01 ng/mL / x     / x     / x      CARDIAC MARKERS ( 03 Oct 2020 05:54 )  x     / 0.02 ng/mL / x     / x     / x      CARDIAC MARKERS ( 02 Oct 2020 21:37 )  x     / 0.02 ng/mL / 120 U/L / x     / 1.9 ng/mL      Hgb A1C: 6.9    EKG:  In chart    CXR:  < from: Xray Chest 2 Views PA/Lat (10.03.20 @ 19:22) >  INTERPRETATION:  Clinical History: Preop    Frontal examination of the chest demonstrates mild cardiomegaly. Small right effusion. Mild dextroscoliosis thoracicspine with degenerative changes.    IMPRESSION: Small right effusion    < end of copied text >    Cath Report:  Non obstructive CAD    Echo:  < from: TTE Echo Complete w/o Contrast w/ Doppler (10.03.20 @ 10:06) >  CONCLUSIONS:     1. Severely dilated left atrium.   2. There is mild-to-moderate concentric left ventricular hypertrophy. There are no regional wall motion abnormalities seen. Left ventricular systolic function is hyperdynamic with a calculated ejection fraction of >75%.   3. The right ventricle is normal in size. Right ventricular systolic function is normal.   4. No evidence of pulmonary hypertension.   5. Very severe, anteriorly directed mitral insufficiency. Posterior leaflet prolapse noted with likely flail component of P2 segment.   6. No other significant valvular disease.   7. No pericardial effusion.   8. No prior echo is available for comparison.    < end of copied text >      PFT's: Pending    Carotid Duplex: Complete, pending results      A/P:    This is a 68 y/o F with a PMHx of HLD, DM, SVT s/p ablation, cardiomyopathy, paroxysmal VT, non-obstructive CAD, left adrenal mass, multinodular goiter, HFpEF (EF 65-70%) grade 1 diastolic dysfunction, and recent hospital admission for COVID (2020). Patient states she went to [facility] on 20 with complaints of acute SOB. Patient denies doing anything at the time of the SOB and that it came on "suddenly" and she was "gasping for air" with associated mild chest pain. Nothing made her breathing better or worse. Patient was BIBA to  and was found to have CHF exacerbation and was diuresed. ECHO completed on 20 showing EF 65-70% and moderate to severe MR. 10/1/20 VAL was completed showing MV with severe eccentric jet directed anteriorly. Patient then underwent diagnostic cardiac cath revealing non-obstructive minimal CAD and severe MR. Patient transferred to [facility] under the care of Dr. Orozco for surgical evaluation of mitral valve. She was deemed a good surgical candidate and is now planned for MVR on 10/5/20.     Neurovascular:   - No delirium. Pain well controlled with current regimen.  -Continue tylenol PRN    Cardiovascular:   - Pre op MVR 10/5: pre op testing complete pending PFTs and read on carotids  -Hemodynamically stable. HR controlled ()  - Hx of HTN, BP controlled (91//84), continue metoprolol 25 mg q12  - HFpEF: continue furosemide 20 daily   - Hx SVT s/p ablation  - Pre op: continue ASA, atorvastatin 40 mg       Respiratory:   - 02 Sat = 98% on RA.  -Encourage C+DB and Use of IS 10x / hr while awake.  -CXR no acute pathology  - Hx COVID, now has antibodies     GI:   - Stable.  -NPO after MN for OR   -Continue GI PPX with protonix  -PO Diet.    Renal / :  - BUN/Cr stable at 20/0.80  -Continue to monitor renal function.  -Monitor I/O's.  - Replete electrolytes PRN    Endocrine:    -A1c 6.9, no known hx diabetes, continue to monitor finger sticks and MISS  -TSH 0.95, no known hx thyroid disease     Hematologic:  -H/H stable at 12.6/39.1obvious signs of bleeding  -Coagulation Panel.    ID:  -Pt remains afebrile with no elevation in WBC or signs of infection  -Continue to monitor CBC  -Observe for SIRS/Sepsis Syndrome.    Prophylaxis:  -DVT prophylaxis with 5000 SubQ Heparin q8h.  -SCD's    Disposition:  - OR tomorrow      Consult in Chart?  YES   Consent in Chart? YES   Pre-op Orders Placed? YES   Blood Prodeucts Ordered? YES  NPO ordered? YES

## 2020-10-05 ENCOUNTER — APPOINTMENT (OUTPATIENT)
Dept: CARDIOTHORACIC SURGERY | Facility: HOSPITAL | Age: 65
End: 2020-10-05

## 2020-10-05 ENCOUNTER — RESULT REVIEW (OUTPATIENT)
Age: 65
End: 2020-10-05

## 2020-10-05 LAB
ALBUMIN SERPL ELPH-MCNC: 2.5 G/DL — LOW (ref 3.3–5)
ALP SERPL-CCNC: 73 U/L — SIGNIFICANT CHANGE UP (ref 40–120)
ALT FLD-CCNC: 16 U/L — SIGNIFICANT CHANGE UP (ref 10–45)
ANION GAP SERPL CALC-SCNC: 14 MMOL/L — SIGNIFICANT CHANGE UP (ref 5–17)
ANION GAP SERPL CALC-SCNC: 16 MMOL/L — SIGNIFICANT CHANGE UP (ref 5–17)
ANISOCYTOSIS BLD QL: SLIGHT — SIGNIFICANT CHANGE UP
APTT BLD: 43.2 SEC — HIGH (ref 27.5–35.5)
AST SERPL-CCNC: 38 U/L — SIGNIFICANT CHANGE UP (ref 10–40)
BASOPHILS # BLD AUTO: 0 K/UL — SIGNIFICANT CHANGE UP (ref 0–0.2)
BASOPHILS NFR BLD AUTO: 0 % — SIGNIFICANT CHANGE UP (ref 0–2)
BILIRUB SERPL-MCNC: 0.7 MG/DL — SIGNIFICANT CHANGE UP (ref 0.2–1.2)
BLD GP AB SCN SERPL QL: NEGATIVE — SIGNIFICANT CHANGE UP
BUN SERPL-MCNC: 15 MG/DL — SIGNIFICANT CHANGE UP (ref 7–23)
BUN SERPL-MCNC: 19 MG/DL — SIGNIFICANT CHANGE UP (ref 7–23)
BURR CELLS BLD QL SMEAR: SLIGHT — SIGNIFICANT CHANGE UP
CALCIUM SERPL-MCNC: 10 MG/DL — SIGNIFICANT CHANGE UP (ref 8.4–10.5)
CALCIUM SERPL-MCNC: 9.4 MG/DL — SIGNIFICANT CHANGE UP (ref 8.4–10.5)
CHLORIDE SERPL-SCNC: 103 MMOL/L — SIGNIFICANT CHANGE UP (ref 96–108)
CHLORIDE SERPL-SCNC: 106 MMOL/L — SIGNIFICANT CHANGE UP (ref 96–108)
CO2 SERPL-SCNC: 18 MMOL/L — LOW (ref 22–31)
CO2 SERPL-SCNC: 23 MMOL/L — SIGNIFICANT CHANGE UP (ref 22–31)
CREAT SERPL-MCNC: 0.76 MG/DL — SIGNIFICANT CHANGE UP (ref 0.5–1.3)
CREAT SERPL-MCNC: 0.98 MG/DL — SIGNIFICANT CHANGE UP (ref 0.5–1.3)
EOSINOPHIL # BLD AUTO: 0 K/UL — SIGNIFICANT CHANGE UP (ref 0–0.5)
EOSINOPHIL NFR BLD AUTO: 0 % — SIGNIFICANT CHANGE UP (ref 0–6)
GAS PNL BLDA: SIGNIFICANT CHANGE UP
GIANT PLATELETS BLD QL SMEAR: PRESENT — SIGNIFICANT CHANGE UP
GLUCOSE BLDC GLUCOMTR-MCNC: 121 MG/DL — HIGH (ref 70–99)
GLUCOSE BLDC GLUCOMTR-MCNC: 135 MG/DL — HIGH (ref 70–99)
GLUCOSE BLDC GLUCOMTR-MCNC: 138 MG/DL — HIGH (ref 70–99)
GLUCOSE BLDC GLUCOMTR-MCNC: 144 MG/DL — HIGH (ref 70–99)
GLUCOSE SERPL-MCNC: 124 MG/DL — HIGH (ref 70–99)
GLUCOSE SERPL-MCNC: 166 MG/DL — HIGH (ref 70–99)
HCT VFR BLD CALC: 27.7 % — LOW (ref 34.5–45)
HCT VFR BLD CALC: 36.4 % — SIGNIFICANT CHANGE UP (ref 34.5–45)
HGB BLD-MCNC: 11.8 G/DL — SIGNIFICANT CHANGE UP (ref 11.5–15.5)
HGB BLD-MCNC: 9.2 G/DL — LOW (ref 11.5–15.5)
INR BLD: 1.21 — HIGH (ref 0.88–1.16)
LACTATE SERPL-SCNC: 4.3 MMOL/L — CRITICAL HIGH (ref 0.5–2)
LYMPHOCYTES # BLD AUTO: 0.79 K/UL — LOW (ref 1–3.3)
LYMPHOCYTES # BLD AUTO: 2.6 % — LOW (ref 13–44)
MACROCYTES BLD QL: SLIGHT — SIGNIFICANT CHANGE UP
MAGNESIUM SERPL-MCNC: 2.2 MG/DL — SIGNIFICANT CHANGE UP (ref 1.6–2.6)
MAGNESIUM SERPL-MCNC: 3.4 MG/DL — HIGH (ref 1.6–2.6)
MANUAL SMEAR VERIFICATION: SIGNIFICANT CHANGE UP
MCHC RBC-ENTMCNC: 29.2 PG — SIGNIFICANT CHANGE UP (ref 27–34)
MCHC RBC-ENTMCNC: 30.5 PG — SIGNIFICANT CHANGE UP (ref 27–34)
MCHC RBC-ENTMCNC: 32.4 GM/DL — SIGNIFICANT CHANGE UP (ref 32–36)
MCHC RBC-ENTMCNC: 33.2 GM/DL — SIGNIFICANT CHANGE UP (ref 32–36)
MCV RBC AUTO: 90.1 FL — SIGNIFICANT CHANGE UP (ref 80–100)
MCV RBC AUTO: 91.7 FL — SIGNIFICANT CHANGE UP (ref 80–100)
MICROCYTES BLD QL: SLIGHT — SIGNIFICANT CHANGE UP
MONOCYTES # BLD AUTO: 0.79 K/UL — SIGNIFICANT CHANGE UP (ref 0–0.9)
MONOCYTES NFR BLD AUTO: 2.6 % — SIGNIFICANT CHANGE UP (ref 2–14)
NEUTROPHILS # BLD AUTO: 28.9 K/UL — HIGH (ref 1.8–7.4)
NEUTROPHILS NFR BLD AUTO: 94.8 % — HIGH (ref 43–77)
NRBC # BLD: 0 /100 WBCS — SIGNIFICANT CHANGE UP (ref 0–0)
PHOSPHATE SERPL-MCNC: 3.9 MG/DL — SIGNIFICANT CHANGE UP (ref 2.5–4.5)
PLAT MORPH BLD: NORMAL — SIGNIFICANT CHANGE UP
PLATELET # BLD AUTO: 166 K/UL — SIGNIFICANT CHANGE UP (ref 150–400)
PLATELET # BLD AUTO: 257 K/UL — SIGNIFICANT CHANGE UP (ref 150–400)
POIKILOCYTOSIS BLD QL AUTO: SLIGHT — SIGNIFICANT CHANGE UP
POLYCHROMASIA BLD QL SMEAR: SLIGHT — SIGNIFICANT CHANGE UP
POTASSIUM SERPL-MCNC: 3.8 MMOL/L — SIGNIFICANT CHANGE UP (ref 3.5–5.3)
POTASSIUM SERPL-MCNC: 4.7 MMOL/L — SIGNIFICANT CHANGE UP (ref 3.5–5.3)
POTASSIUM SERPL-SCNC: 3.8 MMOL/L — SIGNIFICANT CHANGE UP (ref 3.5–5.3)
POTASSIUM SERPL-SCNC: 4.7 MMOL/L — SIGNIFICANT CHANGE UP (ref 3.5–5.3)
PROT SERPL-MCNC: 5.1 G/DL — LOW (ref 6–8.3)
PROTHROM AB SERPL-ACNC: 14.4 SEC — HIGH (ref 10.6–13.6)
RBC # BLD: 3.02 M/UL — LOW (ref 3.8–5.2)
RBC # BLD: 4.04 M/UL — SIGNIFICANT CHANGE UP (ref 3.8–5.2)
RBC # FLD: 15 % — HIGH (ref 10.3–14.5)
RBC # FLD: 15.2 % — HIGH (ref 10.3–14.5)
RBC BLD AUTO: ABNORMAL
RH IG SCN BLD-IMP: POSITIVE — SIGNIFICANT CHANGE UP
SODIUM SERPL-SCNC: 140 MMOL/L — SIGNIFICANT CHANGE UP (ref 135–145)
SODIUM SERPL-SCNC: 140 MMOL/L — SIGNIFICANT CHANGE UP (ref 135–145)
SPHEROCYTES BLD QL SMEAR: SLIGHT — SIGNIFICANT CHANGE UP
WBC # BLD: 30.48 K/UL — HIGH (ref 3.8–10.5)
WBC # BLD: 8.06 K/UL — SIGNIFICANT CHANGE UP (ref 3.8–10.5)
WBC # FLD AUTO: 30.48 K/UL — HIGH (ref 3.8–10.5)
WBC # FLD AUTO: 8.06 K/UL — SIGNIFICANT CHANGE UP (ref 3.8–10.5)

## 2020-10-05 PROCEDURE — 94010 BREATHING CAPACITY TEST: CPT | Mod: 26

## 2020-10-05 PROCEDURE — 33426 REPAIR OF MITRAL VALVE: CPT

## 2020-10-05 PROCEDURE — 93010 ELECTROCARDIOGRAM REPORT: CPT

## 2020-10-05 PROCEDURE — 99291 CRITICAL CARE FIRST HOUR: CPT

## 2020-10-05 PROCEDURE — 71045 X-RAY EXAM CHEST 1 VIEW: CPT | Mod: 26

## 2020-10-05 PROCEDURE — 88305 TISSUE EXAM BY PATHOLOGIST: CPT | Mod: 26

## 2020-10-05 PROCEDURE — 99292 CRITICAL CARE ADDL 30 MIN: CPT

## 2020-10-05 RX ORDER — CHLORHEXIDINE GLUCONATE 213 G/1000ML
15 SOLUTION TOPICAL EVERY 12 HOURS
Refills: 0 | Status: DISCONTINUED | OUTPATIENT
Start: 2020-10-05 | End: 2020-10-06

## 2020-10-05 RX ORDER — POTASSIUM CHLORIDE 20 MEQ
20 PACKET (EA) ORAL ONCE
Refills: 0 | Status: COMPLETED | OUTPATIENT
Start: 2020-10-05 | End: 2020-10-05

## 2020-10-05 RX ORDER — LORATADINE 10 MG/1
10 TABLET ORAL DAILY
Refills: 0 | Status: DISCONTINUED | OUTPATIENT
Start: 2020-10-05 | End: 2020-10-09

## 2020-10-05 RX ORDER — DEXTROSE 50 % IN WATER 50 %
25 SYRINGE (ML) INTRAVENOUS
Refills: 0 | Status: DISCONTINUED | OUTPATIENT
Start: 2020-10-05 | End: 2020-10-07

## 2020-10-05 RX ORDER — CEFAZOLIN SODIUM 1 G
2000 VIAL (EA) INJECTION EVERY 8 HOURS
Refills: 0 | Status: COMPLETED | OUTPATIENT
Start: 2020-10-05 | End: 2020-10-07

## 2020-10-05 RX ORDER — ASPIRIN/CALCIUM CARB/MAGNESIUM 324 MG
81 TABLET ORAL DAILY
Refills: 0 | Status: DISCONTINUED | OUTPATIENT
Start: 2020-10-05 | End: 2020-10-09

## 2020-10-05 RX ORDER — PANTOPRAZOLE SODIUM 20 MG/1
40 TABLET, DELAYED RELEASE ORAL DAILY
Refills: 0 | Status: DISCONTINUED | OUTPATIENT
Start: 2020-10-05 | End: 2020-10-06

## 2020-10-05 RX ORDER — FENTANYL CITRATE 50 UG/ML
25 INJECTION INTRAVENOUS EVERY 6 HOURS
Refills: 0 | Status: DISCONTINUED | OUTPATIENT
Start: 2020-10-05 | End: 2020-10-06

## 2020-10-05 RX ORDER — DEXTROSE 50 % IN WATER 50 %
50 SYRINGE (ML) INTRAVENOUS
Refills: 0 | Status: DISCONTINUED | OUTPATIENT
Start: 2020-10-05 | End: 2020-10-09

## 2020-10-05 RX ORDER — PROPOFOL 10 MG/ML
20 INJECTION, EMULSION INTRAVENOUS
Qty: 1000 | Refills: 0 | Status: DISCONTINUED | OUTPATIENT
Start: 2020-10-05 | End: 2020-10-06

## 2020-10-05 RX ORDER — HEPARIN SODIUM 5000 [USP'U]/ML
5000 INJECTION INTRAVENOUS; SUBCUTANEOUS EVERY 8 HOURS
Refills: 0 | Status: DISCONTINUED | OUTPATIENT
Start: 2020-10-05 | End: 2020-10-09

## 2020-10-05 RX ORDER — POLYETHYLENE GLYCOL 3350 17 G/17G
17 POWDER, FOR SOLUTION ORAL DAILY
Refills: 0 | Status: DISCONTINUED | OUTPATIENT
Start: 2020-10-05 | End: 2020-10-06

## 2020-10-05 RX ORDER — INSULIN HUMAN 100 [IU]/ML
1 INJECTION, SOLUTION SUBCUTANEOUS
Qty: 50 | Refills: 0 | Status: DISCONTINUED | OUTPATIENT
Start: 2020-10-05 | End: 2020-10-06

## 2020-10-05 RX ORDER — ALBUMIN HUMAN 25 %
250 VIAL (ML) INTRAVENOUS
Refills: 0 | Status: COMPLETED | OUTPATIENT
Start: 2020-10-05 | End: 2020-10-05

## 2020-10-05 RX ORDER — SODIUM CHLORIDE 9 MG/ML
1000 INJECTION INTRAMUSCULAR; INTRAVENOUS; SUBCUTANEOUS
Refills: 0 | Status: DISCONTINUED | OUTPATIENT
Start: 2020-10-05 | End: 2020-10-07

## 2020-10-05 RX ORDER — ATORVASTATIN CALCIUM 80 MG/1
80 TABLET, FILM COATED ORAL AT BEDTIME
Refills: 0 | Status: DISCONTINUED | OUTPATIENT
Start: 2020-10-05 | End: 2020-10-09

## 2020-10-05 RX ORDER — BUPIVACAINE 13.3 MG/ML
20 INJECTION, SUSPENSION, LIPOSOMAL INFILTRATION ONCE
Refills: 0 | Status: DISCONTINUED | OUTPATIENT
Start: 2020-10-05 | End: 2020-10-05

## 2020-10-05 RX ORDER — DEXMEDETOMIDINE HYDROCHLORIDE IN 0.9% SODIUM CHLORIDE 4 UG/ML
0.2 INJECTION INTRAVENOUS
Qty: 400 | Refills: 0 | Status: DISCONTINUED | OUTPATIENT
Start: 2020-10-05 | End: 2020-10-06

## 2020-10-05 RX ADMIN — FENTANYL CITRATE 25 MICROGRAM(S): 50 INJECTION INTRAVENOUS at 20:45

## 2020-10-05 RX ADMIN — Medication 25 MILLIGRAM(S): at 05:33

## 2020-10-05 RX ADMIN — CHLORHEXIDINE GLUCONATE 15 MILLILITER(S): 213 SOLUTION TOPICAL at 12:34

## 2020-10-05 RX ADMIN — Medication 100 MILLIEQUIVALENT(S): at 21:20

## 2020-10-05 RX ADMIN — HEPARIN SODIUM 5000 UNIT(S): 5000 INJECTION INTRAVENOUS; SUBCUTANEOUS at 05:33

## 2020-10-05 RX ADMIN — CHLORHEXIDINE GLUCONATE 1 APPLICATION(S): 213 SOLUTION TOPICAL at 05:31

## 2020-10-05 RX ADMIN — Medication 10 MILLIEQUIVALENT(S): at 11:36

## 2020-10-05 RX ADMIN — SODIUM CHLORIDE 3 MILLILITER(S): 9 INJECTION INTRAMUSCULAR; INTRAVENOUS; SUBCUTANEOUS at 05:41

## 2020-10-05 RX ADMIN — INSULIN HUMAN 1 UNIT(S)/HR: 100 INJECTION, SOLUTION SUBCUTANEOUS at 21:53

## 2020-10-05 RX ADMIN — PANTOPRAZOLE SODIUM 40 MILLIGRAM(S): 20 TABLET, DELAYED RELEASE ORAL at 05:34

## 2020-10-05 RX ADMIN — SODIUM CHLORIDE 10 MILLILITER(S): 9 INJECTION INTRAMUSCULAR; INTRAVENOUS; SUBCUTANEOUS at 23:56

## 2020-10-05 RX ADMIN — Medication 20 MILLIGRAM(S): at 05:33

## 2020-10-05 RX ADMIN — DEXMEDETOMIDINE HYDROCHLORIDE IN 0.9% SODIUM CHLORIDE 3.37 MICROGRAM(S)/KG/HR: 4 INJECTION INTRAVENOUS at 23:56

## 2020-10-05 RX ADMIN — Medication 100 MILLIGRAM(S): at 23:46

## 2020-10-05 RX ADMIN — Medication 125 MILLILITER(S): at 20:30

## 2020-10-05 RX ADMIN — Medication 125 MILLILITER(S): at 20:45

## 2020-10-05 RX ADMIN — PROPOFOL 8.08 MICROGRAM(S)/KG/MIN: 10 INJECTION, EMULSION INTRAVENOUS at 23:00

## 2020-10-05 RX ADMIN — Medication 81 MILLIGRAM(S): at 11:36

## 2020-10-05 RX ADMIN — FENTANYL CITRATE 25 MICROGRAM(S): 50 INJECTION INTRAVENOUS at 21:00

## 2020-10-05 NOTE — PROGRESS NOTE ADULT - SUBJECTIVE AND OBJECTIVE BOX
CTICU  CRITICAL  CARE  attending     Hand off received 					   Pertinent clinical, laboratory, radiographic, hemodynamic, echocardiographic, respiratory data, microbiologic data and chart were reviewed and analyzed frequently throughout the course of the day and night    Patient seen and examined with CTS/ SH attending at bedside    65 years old female with DM, HLD, SVT s/p ablation, cardiomyopathy, paroxysmal VT, non-obstructive CAD, left adrenal mass, multinodular goiter, HFpEF (EF 65-70%) grade 1 diastolic dysfunction, and recent hospital admission for COVID (2020).   Patient was admitted to [facility] on 20 with CHF. MI was ruled out.  She had complaints of acute SOB that  came on "suddenly" and she was "gasping for air" with associated mild chest pain.   Nothing made her breathing better or worse. She was diuresed.   ECHO showed EF 65-70% and moderate to severe MR. 10/1/20 VAL was completed showing MV with severe eccentric jet directed anteriorly.   Patient then underwent diagnostic cardiac cath revealing non-obstructive minimal CAD and severe MR.   The Patient was  transferred to [facility] under the care of Dr. rOozco for mitral valve repair or replacement.  Patient currently feels well and denies any CP, palpitations, SOB, wheezing, abd pain, n/v/d/c, fevers or chills.       FAMILY HISTORY:  No pertinent family history  No significant family history    PAST MEDICAL & SURGICAL HISTORY:  Essential hypertension  HTN (hypertension)  Other specified cardiac dysrhythmias  SVT (supraventricular tachycardia) s/p ablation  Hyperlipidemia  Hyperlipidemia  Palpitations  Palpitations  Premature beats  PVC (premature ventricular contraction)      14 system review was unremarkable    Vital signs, hemodynamic and respiratory parameters were reviewed from the bedside nursing flow sheet.  ICU Vital Signs Last 24 Hrs  T(C): 36.8 (05 Oct 2020 13:22), Max: 36.8 (05 Oct 2020 13:22)  T(F): 98.2 (05 Oct 2020 13:22), Max: 98.2 (05 Oct 2020 13:22)  HR: 79 (05 Oct 2020 21:00) (79 - 107)  BP: 95/57 (05 Oct 2020 08:46) (95/57 - 120/71)  BP(mean): --  ABP: 152/80 (05 Oct 2020 21:00) (115/56 - 152/80)  ABP(mean): 108 (05 Oct 2020 21:00) (79 - 108)  RR: 12 (05 Oct 2020 21:00) (12 - 18)  SpO2: 100% (05 Oct 2020 21:00) (93% - 100%)    Adult Advanced Hemodynamics Last 24 Hrs  CVP(mm Hg): 13 (05 Oct 2020 21:00) (2 - 13)  CVP(cm H2O): --  CO: --  CI: --  PA: --  PA(mean): --  PCWP: --  SVR: --  SVRI: --  PVR: --  PVRI: --, ABG - ( 05 Oct 2020 20:18 )  pH, Arterial: 7.38  pH, Blood: x     /  pCO2: 34    /  pO2: 225   / HCO3: 20    / Base Excess: -4.7  /  SaO2: 99                Mode: AC/ CMV (Assist Control/ Continuous Mandatory Ventilation)  RR (machine): 12  TV (machine): 500  FiO2: 50  PEEP: 5  ITime: 1  MAP: 9  PIP: 27    Intake and output was reviewed and the fluid balance was calculated  Daily Height in cm: 152.4 (05 Oct 2020 07:49)    Daily Weight in k.3 (05 Oct 2020 06:49)  I&O's Summary    04 Oct 2020 07:01  -  05 Oct 2020 07:00  --------------------------------------------------------  IN: 300 mL / OUT: 550 mL / NET: -250 mL    05 Oct 2020 07:01  -  05 Oct 2020 21:17  --------------------------------------------------------  IN: 520 mL / OUT: 205 mL / NET: 315 mL        All lines and drain sites were assessed      Neuro: Pupils 2 mm (Reactive). Follows commands.  Moves all 4 extremities.  Neck: No JVD.  CVS: S1, S2, No S3.  Lungs: Good air entry bilaterally.  Abd: Soft. No tenderness. + Bowel sounds.  Vascular: + DP/PT.  Extremities: No edema.  Lymphatic: Normal.  Skin: No abnormalities.      labs  CBC Full  -  ( 05 Oct 2020 20:17 )  WBC Count : 30.48 K/uL  RBC Count : 3.02 M/uL  Hemoglobin : 9.2 g/dL  Hematocrit : 27.7 %  Platelet Count - Automated : 166 K/uL  Mean Cell Volume : 91.7 fl  Mean Cell Hemoglobin : 30.5 pg  Mean Cell Hemoglobin Concentration : 33.2 gm/dL  Auto Neutrophil # : 28.90 K/uL  Auto Lymphocyte # : 0.79 K/uL  Auto Monocyte # : 0.79 K/uL  Auto Eosinophil # : 0.00 K/uL  Auto Basophil # : 0.00 K/uL  Auto Neutrophil % : 94.8 %  Auto Lymphocyte % : 2.6 %  Auto Monocyte % : 2.6 %  Auto Eosinophil % : 0.0 %  Auto Basophil % : 0.0 %    10-05    140  |  106  |  15  ----------------------------<  166<H>  3.8   |  18<L>  |  0.76    Ca    10.0      05 Oct 2020 20:17  Phos  3.9     10-05  Mg     3.4     10-05    TPro  5.1<L>  /  Alb  2.5<L>  /  TBili  0.7  /  DBili  x   /  AST  38  /  ALT  16  /  AlkPhos  73  10-05    PT/INR - ( 05 Oct 2020 20:17 )   PT: 14.4 sec;   INR: 1.21          PTT - ( 05 Oct 2020 20:17 )  PTT:43.2 sec  The current medications were reviewed   MEDICATIONS  (STANDING):  albumin human  5% IVPB 250 milliLiter(s) IV Intermittent every 30 minutes  aspirin enteric coated 81 milliGRAM(s) Oral daily  atorvastatin 80 milliGRAM(s) Oral at bedtime  ceFAZolin   IVPB 2000 milliGRAM(s) IV Intermittent every 8 hours  chlorhexidine 0.12% Liquid 15 milliLiter(s) Oral Mucosa every 12 hours  dextrose 50% Injectable 50 milliLiter(s) IV Push every 15 minutes  dextrose 50% Injectable 25 milliLiter(s) IV Push every 15 minutes  heparin   Injectable 5000 Unit(s) SubCutaneous every 8 hours  insulin regular Infusion 1 Unit(s)/Hr (1 mL/Hr) IV Continuous <Continuous>  loratadine 10 milliGRAM(s) Oral daily  pantoprazole  Injectable 40 milliGRAM(s) IV Push daily  polyethylene glycol 3350 17 Gram(s) Oral daily  potassium chloride  20 mEq/100 mL IVPB 20 milliEquivalent(s) IV Intermittent once  sodium chloride 0.9%. 1000 milliLiter(s) (10 mL/Hr) IV Continuous <Continuous>    MEDICATIONS  (PRN):  fentaNYL    Injectable 25 MICROGram(s) IV Push every 6 hours PRN Severe Pain (7 - 10)          Patient is a 65y old  Female who presents with a chief complaint of Mitral regurgitation   S/P Mitral valve repair  S/P mitral valve ring annuloplasty  Hemodynamically stable.  Hyperglycemia.  Metabolic acidosis.  Good oxygenation.  Fair urine out put.  Overall doing well.      My plan includes :  WEAN vent as tolerated.  D/C epinephrine.  Statin and Betablocker.  Close hemodynamic, ventilatory and drain monitoring and management  Monitor for arrhythmias and monitor parameters for organ perfusion  Monitor neurologic status  Monitor renal function.  Head of the bed should remain elevated to 45 deg .   Chest PT and IS will be encouraged  Monitor adequacy of oxygenation and ventilation and attempt to wean oxygen  Nutritional goals will be met using po eventually , ensure adequate caloric intake and monitor the same  Stress ulcer and VTE prophylaxis will be achieved    OPTIMIZE Glycemic control.  Electrolytes have been repleted as necessary and wound care has been carried out. Pain control has been achieved.   Aggressive physical therapy and early mobility and ambulation goals will be met   The family was updated about the course and plan  CRITICAL CARE TIME SPENT in evaluation and management, reassessments, review and interpretation of labs and x-rays, ventilator and hemodynamic management, formulating a plan and coordinating care: ___90____ MIN.  Time does not include procedural time.  CTICU ATTENDING     					    Darrell Hyman MD

## 2020-10-05 NOTE — BRIEF OPERATIVE NOTE - COMMENTS
I first assisted for the entirety of the case, including but not limited to opening, cannulation, valve repair/replacement, decannulation, and closure.

## 2020-10-06 LAB
ALBUMIN SERPL ELPH-MCNC: 2.8 G/DL — LOW (ref 3.3–5)
ALBUMIN SERPL ELPH-MCNC: 3.1 G/DL — LOW (ref 3.3–5)
ALBUMIN SERPL ELPH-MCNC: 3.4 G/DL — SIGNIFICANT CHANGE UP (ref 3.3–5)
ALBUMIN SERPL ELPH-MCNC: 3.6 G/DL — SIGNIFICANT CHANGE UP (ref 3.3–5)
ALP SERPL-CCNC: 70 U/L — SIGNIFICANT CHANGE UP (ref 40–120)
ALP SERPL-CCNC: 70 U/L — SIGNIFICANT CHANGE UP (ref 40–120)
ALP SERPL-CCNC: 71 U/L — SIGNIFICANT CHANGE UP (ref 40–120)
ALP SERPL-CCNC: 74 U/L — SIGNIFICANT CHANGE UP (ref 40–120)
ALT FLD-CCNC: 16 U/L — SIGNIFICANT CHANGE UP (ref 10–45)
ALT FLD-CCNC: 17 U/L — SIGNIFICANT CHANGE UP (ref 10–45)
ALT FLD-CCNC: 17 U/L — SIGNIFICANT CHANGE UP (ref 10–45)
ALT FLD-CCNC: SIGNIFICANT CHANGE UP (ref 10–45)
ANION GAP SERPL CALC-SCNC: 10 MMOL/L — SIGNIFICANT CHANGE UP (ref 5–17)
ANION GAP SERPL CALC-SCNC: 10 MMOL/L — SIGNIFICANT CHANGE UP (ref 5–17)
ANION GAP SERPL CALC-SCNC: 11 MMOL/L — SIGNIFICANT CHANGE UP (ref 5–17)
ANION GAP SERPL CALC-SCNC: 12 MMOL/L — SIGNIFICANT CHANGE UP (ref 5–17)
ANION GAP SERPL CALC-SCNC: 13 MMOL/L — SIGNIFICANT CHANGE UP (ref 5–17)
ANION GAP SERPL CALC-SCNC: 13 MMOL/L — SIGNIFICANT CHANGE UP (ref 5–17)
APTT BLD: 101.2 SEC — HIGH (ref 27.5–35.5)
APTT BLD: 125.3 SEC — CRITICAL HIGH (ref 27.5–35.5)
APTT BLD: 26.7 SEC — LOW (ref 27.5–35.5)
APTT BLD: 28.4 SEC — SIGNIFICANT CHANGE UP (ref 27.5–35.5)
APTT BLD: 37.7 SEC — HIGH (ref 27.5–35.5)
APTT BLD: 80 SEC — HIGH (ref 27.5–35.5)
AST SERPL-CCNC: 38 U/L — SIGNIFICANT CHANGE UP (ref 10–40)
AST SERPL-CCNC: 44 U/L — HIGH (ref 10–40)
AST SERPL-CCNC: 47 U/L — HIGH (ref 10–40)
AST SERPL-CCNC: SIGNIFICANT CHANGE UP (ref 10–40)
BILIRUB SERPL-MCNC: 1 MG/DL — SIGNIFICANT CHANGE UP (ref 0.2–1.2)
BILIRUB SERPL-MCNC: 1 MG/DL — SIGNIFICANT CHANGE UP (ref 0.2–1.2)
BILIRUB SERPL-MCNC: 1.1 MG/DL — SIGNIFICANT CHANGE UP (ref 0.2–1.2)
BILIRUB SERPL-MCNC: 1.7 MG/DL — HIGH (ref 0.2–1.2)
BUN SERPL-MCNC: 14 MG/DL — SIGNIFICANT CHANGE UP (ref 7–23)
BUN SERPL-MCNC: 15 MG/DL — SIGNIFICANT CHANGE UP (ref 7–23)
CALCIUM SERPL-MCNC: 8.1 MG/DL — LOW (ref 8.4–10.5)
CALCIUM SERPL-MCNC: 8.2 MG/DL — LOW (ref 8.4–10.5)
CALCIUM SERPL-MCNC: 8.3 MG/DL — LOW (ref 8.4–10.5)
CALCIUM SERPL-MCNC: 8.7 MG/DL — SIGNIFICANT CHANGE UP (ref 8.4–10.5)
CALCIUM SERPL-MCNC: 9.1 MG/DL — SIGNIFICANT CHANGE UP (ref 8.4–10.5)
CALCIUM SERPL-MCNC: 9.4 MG/DL — SIGNIFICANT CHANGE UP (ref 8.4–10.5)
CHLORIDE SERPL-SCNC: 100 MMOL/L — SIGNIFICANT CHANGE UP (ref 96–108)
CHLORIDE SERPL-SCNC: 101 MMOL/L — SIGNIFICANT CHANGE UP (ref 96–108)
CHLORIDE SERPL-SCNC: 101 MMOL/L — SIGNIFICANT CHANGE UP (ref 96–108)
CHLORIDE SERPL-SCNC: 103 MMOL/L — SIGNIFICANT CHANGE UP (ref 96–108)
CHLORIDE SERPL-SCNC: 105 MMOL/L — SIGNIFICANT CHANGE UP (ref 96–108)
CHLORIDE SERPL-SCNC: 106 MMOL/L — SIGNIFICANT CHANGE UP (ref 96–108)
CO2 SERPL-SCNC: 20 MMOL/L — LOW (ref 22–31)
CO2 SERPL-SCNC: 21 MMOL/L — LOW (ref 22–31)
CO2 SERPL-SCNC: 22 MMOL/L — SIGNIFICANT CHANGE UP (ref 22–31)
CO2 SERPL-SCNC: 23 MMOL/L — SIGNIFICANT CHANGE UP (ref 22–31)
CREAT SERPL-MCNC: 0.65 MG/DL — SIGNIFICANT CHANGE UP (ref 0.5–1.3)
CREAT SERPL-MCNC: 0.67 MG/DL — SIGNIFICANT CHANGE UP (ref 0.5–1.3)
CREAT SERPL-MCNC: 0.69 MG/DL — SIGNIFICANT CHANGE UP (ref 0.5–1.3)
CREAT SERPL-MCNC: 0.72 MG/DL — SIGNIFICANT CHANGE UP (ref 0.5–1.3)
CREAT SERPL-MCNC: 0.72 MG/DL — SIGNIFICANT CHANGE UP (ref 0.5–1.3)
CREAT SERPL-MCNC: 0.73 MG/DL — SIGNIFICANT CHANGE UP (ref 0.5–1.3)
GAS PNL BLDA: SIGNIFICANT CHANGE UP
GLUCOSE BLDC GLUCOMTR-MCNC: 102 MG/DL — HIGH (ref 70–99)
GLUCOSE BLDC GLUCOMTR-MCNC: 124 MG/DL — HIGH (ref 70–99)
GLUCOSE BLDC GLUCOMTR-MCNC: 137 MG/DL — HIGH (ref 70–99)
GLUCOSE BLDC GLUCOMTR-MCNC: 141 MG/DL — HIGH (ref 70–99)
GLUCOSE BLDC GLUCOMTR-MCNC: 143 MG/DL — HIGH (ref 70–99)
GLUCOSE BLDC GLUCOMTR-MCNC: 145 MG/DL — HIGH (ref 70–99)
GLUCOSE BLDC GLUCOMTR-MCNC: 147 MG/DL — HIGH (ref 70–99)
GLUCOSE BLDC GLUCOMTR-MCNC: 148 MG/DL — HIGH (ref 70–99)
GLUCOSE BLDC GLUCOMTR-MCNC: 149 MG/DL — HIGH (ref 70–99)
GLUCOSE BLDC GLUCOMTR-MCNC: 95 MG/DL — SIGNIFICANT CHANGE UP (ref 70–99)
GLUCOSE BLDC GLUCOMTR-MCNC: 97 MG/DL — SIGNIFICANT CHANGE UP (ref 70–99)
GLUCOSE BLDC GLUCOMTR-MCNC: 97 MG/DL — SIGNIFICANT CHANGE UP (ref 70–99)
GLUCOSE SERPL-MCNC: 127 MG/DL — HIGH (ref 70–99)
GLUCOSE SERPL-MCNC: 147 MG/DL — HIGH (ref 70–99)
GLUCOSE SERPL-MCNC: 151 MG/DL — HIGH (ref 70–99)
GLUCOSE SERPL-MCNC: 158 MG/DL — HIGH (ref 70–99)
GLUCOSE SERPL-MCNC: 161 MG/DL — HIGH (ref 70–99)
GLUCOSE SERPL-MCNC: 163 MG/DL — HIGH (ref 70–99)
HCT VFR BLD CALC: 23.1 % — LOW (ref 34.5–45)
HCT VFR BLD CALC: 25.3 % — LOW (ref 34.5–45)
HCT VFR BLD CALC: 27.3 % — LOW (ref 34.5–45)
HCT VFR BLD CALC: 27.6 % — LOW (ref 34.5–45)
HCT VFR BLD CALC: 28.7 % — LOW (ref 34.5–45)
HGB BLD-MCNC: 7.6 G/DL — LOW (ref 11.5–15.5)
HGB BLD-MCNC: 8.4 G/DL — LOW (ref 11.5–15.5)
HGB BLD-MCNC: 8.8 G/DL — LOW (ref 11.5–15.5)
HGB BLD-MCNC: 9.4 G/DL — LOW (ref 11.5–15.5)
HGB BLD-MCNC: 9.8 G/DL — LOW (ref 11.5–15.5)
INR BLD: 1.15 — SIGNIFICANT CHANGE UP (ref 0.88–1.16)
INR BLD: 1.22 — HIGH (ref 0.88–1.16)
INR BLD: 1.23 — HIGH (ref 0.88–1.16)
INR BLD: 1.26 — HIGH (ref 0.88–1.16)
INR BLD: 1.27 — HIGH (ref 0.88–1.16)
INR BLD: 1.28 — HIGH (ref 0.88–1.16)
LACTATE SERPL-SCNC: 1.2 MMOL/L — SIGNIFICANT CHANGE UP (ref 0.5–2)
LACTATE SERPL-SCNC: 1.3 MMOL/L — SIGNIFICANT CHANGE UP (ref 0.5–2)
LACTATE SERPL-SCNC: 1.5 MMOL/L — SIGNIFICANT CHANGE UP (ref 0.5–2)
MAGNESIUM SERPL-MCNC: 1.9 MG/DL — SIGNIFICANT CHANGE UP (ref 1.6–2.6)
MAGNESIUM SERPL-MCNC: 1.9 MG/DL — SIGNIFICANT CHANGE UP (ref 1.6–2.6)
MAGNESIUM SERPL-MCNC: 2.3 MG/DL — SIGNIFICANT CHANGE UP (ref 1.6–2.6)
MAGNESIUM SERPL-MCNC: 2.5 MG/DL — SIGNIFICANT CHANGE UP (ref 1.6–2.6)
MAGNESIUM SERPL-MCNC: 2.7 MG/DL — HIGH (ref 1.6–2.6)
MCHC RBC-ENTMCNC: 29.5 PG — SIGNIFICANT CHANGE UP (ref 27–34)
MCHC RBC-ENTMCNC: 29.6 PG — SIGNIFICANT CHANGE UP (ref 27–34)
MCHC RBC-ENTMCNC: 29.9 PG — SIGNIFICANT CHANGE UP (ref 27–34)
MCHC RBC-ENTMCNC: 30.5 PG — SIGNIFICANT CHANGE UP (ref 27–34)
MCHC RBC-ENTMCNC: 30.9 PG — SIGNIFICANT CHANGE UP (ref 27–34)
MCHC RBC-ENTMCNC: 32.2 GM/DL — SIGNIFICANT CHANGE UP (ref 32–36)
MCHC RBC-ENTMCNC: 32.9 GM/DL — SIGNIFICANT CHANGE UP (ref 32–36)
MCHC RBC-ENTMCNC: 33.2 GM/DL — SIGNIFICANT CHANGE UP (ref 32–36)
MCHC RBC-ENTMCNC: 34.1 GM/DL — SIGNIFICANT CHANGE UP (ref 32–36)
MCHC RBC-ENTMCNC: 34.1 GM/DL — SIGNIFICANT CHANGE UP (ref 32–36)
MCV RBC AUTO: 89.6 FL — SIGNIFICANT CHANGE UP (ref 80–100)
MCV RBC AUTO: 89.9 FL — SIGNIFICANT CHANGE UP (ref 80–100)
MCV RBC AUTO: 90 FL — SIGNIFICANT CHANGE UP (ref 80–100)
MCV RBC AUTO: 90.5 FL — SIGNIFICANT CHANGE UP (ref 80–100)
MCV RBC AUTO: 91.6 FL — SIGNIFICANT CHANGE UP (ref 80–100)
NRBC # BLD: 0 /100 WBCS — SIGNIFICANT CHANGE UP (ref 0–0)
PHOSPHATE SERPL-MCNC: 3.8 MG/DL — SIGNIFICANT CHANGE UP (ref 2.5–4.5)
PHOSPHATE SERPL-MCNC: 3.9 MG/DL — SIGNIFICANT CHANGE UP (ref 2.5–4.5)
PHOSPHATE SERPL-MCNC: 4.2 MG/DL — SIGNIFICANT CHANGE UP (ref 2.5–4.5)
PHOSPHATE SERPL-MCNC: 4.4 MG/DL — SIGNIFICANT CHANGE UP (ref 2.5–4.5)
PHOSPHATE SERPL-MCNC: 4.9 MG/DL — HIGH (ref 2.5–4.5)
PLATELET # BLD AUTO: 148 K/UL — LOW (ref 150–400)
PLATELET # BLD AUTO: 151 K/UL — SIGNIFICANT CHANGE UP (ref 150–400)
PLATELET # BLD AUTO: 154 K/UL — SIGNIFICANT CHANGE UP (ref 150–400)
PLATELET # BLD AUTO: 166 K/UL — SIGNIFICANT CHANGE UP (ref 150–400)
PLATELET # BLD AUTO: 175 K/UL — SIGNIFICANT CHANGE UP (ref 150–400)
POTASSIUM SERPL-MCNC: 4 MMOL/L — SIGNIFICANT CHANGE UP (ref 3.5–5.3)
POTASSIUM SERPL-MCNC: 4.1 MMOL/L — SIGNIFICANT CHANGE UP (ref 3.5–5.3)
POTASSIUM SERPL-MCNC: 4.2 MMOL/L — SIGNIFICANT CHANGE UP (ref 3.5–5.3)
POTASSIUM SERPL-MCNC: 4.3 MMOL/L — SIGNIFICANT CHANGE UP (ref 3.5–5.3)
POTASSIUM SERPL-MCNC: 4.9 MMOL/L — SIGNIFICANT CHANGE UP (ref 3.5–5.3)
POTASSIUM SERPL-MCNC: SIGNIFICANT CHANGE UP (ref 3.5–5.3)
POTASSIUM SERPL-SCNC: 4 MMOL/L — SIGNIFICANT CHANGE UP (ref 3.5–5.3)
POTASSIUM SERPL-SCNC: 4.1 MMOL/L — SIGNIFICANT CHANGE UP (ref 3.5–5.3)
POTASSIUM SERPL-SCNC: 4.2 MMOL/L — SIGNIFICANT CHANGE UP (ref 3.5–5.3)
POTASSIUM SERPL-SCNC: 4.3 MMOL/L — SIGNIFICANT CHANGE UP (ref 3.5–5.3)
POTASSIUM SERPL-SCNC: 4.9 MMOL/L — SIGNIFICANT CHANGE UP (ref 3.5–5.3)
POTASSIUM SERPL-SCNC: SIGNIFICANT CHANGE UP (ref 3.5–5.3)
PROT SERPL-MCNC: 5.6 G/DL — LOW (ref 6–8.3)
PROT SERPL-MCNC: 5.8 G/DL — LOW (ref 6–8.3)
PROT SERPL-MCNC: 5.9 G/DL — LOW (ref 6–8.3)
PROT SERPL-MCNC: 6.1 G/DL — SIGNIFICANT CHANGE UP (ref 6–8.3)
PROTHROM AB SERPL-ACNC: 13.7 SEC — HIGH (ref 10.6–13.6)
PROTHROM AB SERPL-ACNC: 14.5 SEC — HIGH (ref 10.6–13.6)
PROTHROM AB SERPL-ACNC: 14.6 SEC — HIGH (ref 10.6–13.6)
PROTHROM AB SERPL-ACNC: 15 SEC — HIGH (ref 10.6–13.6)
PROTHROM AB SERPL-ACNC: 15.1 SEC — HIGH (ref 10.6–13.6)
PROTHROM AB SERPL-ACNC: 15.2 SEC — HIGH (ref 10.6–13.6)
RBC # BLD: 2.57 M/UL — LOW (ref 3.8–5.2)
RBC # BLD: 2.81 M/UL — LOW (ref 3.8–5.2)
RBC # BLD: 2.98 M/UL — LOW (ref 3.8–5.2)
RBC # BLD: 3.08 M/UL — LOW (ref 3.8–5.2)
RBC # BLD: 3.17 M/UL — LOW (ref 3.8–5.2)
RBC # FLD: 14.8 % — HIGH (ref 10.3–14.5)
RBC # FLD: 14.8 % — HIGH (ref 10.3–14.5)
RBC # FLD: 14.9 % — HIGH (ref 10.3–14.5)
RBC # FLD: 15 % — HIGH (ref 10.3–14.5)
RBC # FLD: 15 % — HIGH (ref 10.3–14.5)
SODIUM SERPL-SCNC: 132 MMOL/L — LOW (ref 135–145)
SODIUM SERPL-SCNC: 134 MMOL/L — LOW (ref 135–145)
SODIUM SERPL-SCNC: 135 MMOL/L — SIGNIFICANT CHANGE UP (ref 135–145)
SODIUM SERPL-SCNC: 138 MMOL/L — SIGNIFICANT CHANGE UP (ref 135–145)
SODIUM SERPL-SCNC: 139 MMOL/L — SIGNIFICANT CHANGE UP (ref 135–145)
SODIUM SERPL-SCNC: 139 MMOL/L — SIGNIFICANT CHANGE UP (ref 135–145)
WBC # BLD: 15.34 K/UL — HIGH (ref 3.8–10.5)
WBC # BLD: 15.47 K/UL — HIGH (ref 3.8–10.5)
WBC # BLD: 16.67 K/UL — HIGH (ref 3.8–10.5)
WBC # BLD: 16.96 K/UL — HIGH (ref 3.8–10.5)
WBC # BLD: 20.19 K/UL — HIGH (ref 3.8–10.5)
WBC # FLD AUTO: 15.34 K/UL — HIGH (ref 3.8–10.5)
WBC # FLD AUTO: 15.47 K/UL — HIGH (ref 3.8–10.5)
WBC # FLD AUTO: 16.67 K/UL — HIGH (ref 3.8–10.5)
WBC # FLD AUTO: 16.96 K/UL — HIGH (ref 3.8–10.5)
WBC # FLD AUTO: 20.19 K/UL — HIGH (ref 3.8–10.5)

## 2020-10-06 PROCEDURE — 99291 CRITICAL CARE FIRST HOUR: CPT

## 2020-10-06 PROCEDURE — 99292 CRITICAL CARE ADDL 30 MIN: CPT

## 2020-10-06 PROCEDURE — 71045 X-RAY EXAM CHEST 1 VIEW: CPT | Mod: 26

## 2020-10-06 PROCEDURE — 99232 SBSQ HOSP IP/OBS MODERATE 35: CPT

## 2020-10-06 RX ORDER — OXYCODONE AND ACETAMINOPHEN 5; 325 MG/1; MG/1
2 TABLET ORAL EVERY 6 HOURS
Refills: 0 | Status: DISCONTINUED | OUTPATIENT
Start: 2020-10-06 | End: 2020-10-09

## 2020-10-06 RX ORDER — CALCIUM GLUCONATE 100 MG/ML
2 VIAL (ML) INTRAVENOUS ONCE
Refills: 0 | Status: COMPLETED | OUTPATIENT
Start: 2020-10-06 | End: 2020-10-06

## 2020-10-06 RX ORDER — METOCLOPRAMIDE HCL 10 MG
10 TABLET ORAL ONCE
Refills: 0 | Status: COMPLETED | OUTPATIENT
Start: 2020-10-06 | End: 2020-10-06

## 2020-10-06 RX ORDER — POLYETHYLENE GLYCOL 3350 17 G/17G
17 POWDER, FOR SOLUTION ORAL DAILY
Refills: 0 | Status: DISCONTINUED | OUTPATIENT
Start: 2020-10-06 | End: 2020-10-09

## 2020-10-06 RX ORDER — IPRATROPIUM/ALBUTEROL SULFATE 18-103MCG
3 AEROSOL WITH ADAPTER (GRAM) INHALATION ONCE
Refills: 0 | Status: COMPLETED | OUTPATIENT
Start: 2020-10-06 | End: 2020-10-06

## 2020-10-06 RX ORDER — FUROSEMIDE 40 MG
20 TABLET ORAL ONCE
Refills: 0 | Status: COMPLETED | OUTPATIENT
Start: 2020-10-06 | End: 2020-10-06

## 2020-10-06 RX ORDER — INSULIN LISPRO 100/ML
2 VIAL (ML) SUBCUTANEOUS
Refills: 0 | Status: DISCONTINUED | OUTPATIENT
Start: 2020-10-06 | End: 2020-10-09

## 2020-10-06 RX ORDER — SODIUM CHLORIDE 9 MG/ML
1000 INJECTION, SOLUTION INTRAVENOUS
Refills: 0 | Status: DISCONTINUED | OUTPATIENT
Start: 2020-10-06 | End: 2020-10-09

## 2020-10-06 RX ORDER — PANTOPRAZOLE SODIUM 20 MG/1
40 TABLET, DELAYED RELEASE ORAL
Refills: 0 | Status: DISCONTINUED | OUTPATIENT
Start: 2020-10-06 | End: 2020-10-09

## 2020-10-06 RX ORDER — FENTANYL CITRATE 50 UG/ML
25 INJECTION INTRAVENOUS ONCE
Refills: 0 | Status: DISCONTINUED | OUTPATIENT
Start: 2020-10-06 | End: 2020-10-06

## 2020-10-06 RX ORDER — INSULIN GLARGINE 100 [IU]/ML
10 INJECTION, SOLUTION SUBCUTANEOUS AT BEDTIME
Refills: 0 | Status: DISCONTINUED | OUTPATIENT
Start: 2020-10-06 | End: 2020-10-09

## 2020-10-06 RX ORDER — DEXTROSE 50 % IN WATER 50 %
15 SYRINGE (ML) INTRAVENOUS ONCE
Refills: 0 | Status: DISCONTINUED | OUTPATIENT
Start: 2020-10-06 | End: 2020-10-07

## 2020-10-06 RX ORDER — SENNA PLUS 8.6 MG/1
2 TABLET ORAL AT BEDTIME
Refills: 0 | Status: DISCONTINUED | OUTPATIENT
Start: 2020-10-06 | End: 2020-10-09

## 2020-10-06 RX ORDER — ACETAMINOPHEN 500 MG
1000 TABLET ORAL ONCE
Refills: 0 | Status: COMPLETED | OUTPATIENT
Start: 2020-10-06 | End: 2020-10-06

## 2020-10-06 RX ORDER — OXYCODONE AND ACETAMINOPHEN 5; 325 MG/1; MG/1
1 TABLET ORAL EVERY 6 HOURS
Refills: 0 | Status: DISCONTINUED | OUTPATIENT
Start: 2020-10-06 | End: 2020-10-09

## 2020-10-06 RX ORDER — GLUCAGON INJECTION, SOLUTION 0.5 MG/.1ML
1 INJECTION, SOLUTION SUBCUTANEOUS ONCE
Refills: 0 | Status: DISCONTINUED | OUTPATIENT
Start: 2020-10-06 | End: 2020-10-09

## 2020-10-06 RX ORDER — HUMAN INSULIN 100 [IU]/ML
5 INJECTION, SUSPENSION SUBCUTANEOUS ONCE
Refills: 0 | Status: COMPLETED | OUTPATIENT
Start: 2020-10-06 | End: 2020-10-06

## 2020-10-06 RX ORDER — BUDESONIDE, MICRONIZED 100 %
0.5 POWDER (GRAM) MISCELLANEOUS ONCE
Refills: 0 | Status: DISCONTINUED | OUTPATIENT
Start: 2020-10-06 | End: 2020-10-06

## 2020-10-06 RX ORDER — ACETAMINOPHEN 500 MG
650 TABLET ORAL EVERY 6 HOURS
Refills: 0 | Status: DISCONTINUED | OUTPATIENT
Start: 2020-10-06 | End: 2020-10-09

## 2020-10-06 RX ORDER — INSULIN LISPRO 100/ML
VIAL (ML) SUBCUTANEOUS
Refills: 0 | Status: DISCONTINUED | OUTPATIENT
Start: 2020-10-06 | End: 2020-10-09

## 2020-10-06 RX ADMIN — HEPARIN SODIUM 5000 UNIT(S): 5000 INJECTION INTRAVENOUS; SUBCUTANEOUS at 14:13

## 2020-10-06 RX ADMIN — SENNA PLUS 2 TABLET(S): 8.6 TABLET ORAL at 21:42

## 2020-10-06 RX ADMIN — Medication 81 MILLIGRAM(S): at 11:31

## 2020-10-06 RX ADMIN — OXYCODONE AND ACETAMINOPHEN 2 TABLET(S): 5; 325 TABLET ORAL at 14:54

## 2020-10-06 RX ADMIN — Medication 2 UNIT(S): at 11:32

## 2020-10-06 RX ADMIN — Medication 200 GRAM(S): at 23:11

## 2020-10-06 RX ADMIN — Medication 20 MILLIGRAM(S): at 13:15

## 2020-10-06 RX ADMIN — HUMAN INSULIN 5 UNIT(S): 100 INJECTION, SUSPENSION SUBCUTANEOUS at 07:00

## 2020-10-06 RX ADMIN — OXYCODONE AND ACETAMINOPHEN 2 TABLET(S): 5; 325 TABLET ORAL at 07:30

## 2020-10-06 RX ADMIN — Medication 2 UNIT(S): at 17:48

## 2020-10-06 RX ADMIN — PANTOPRAZOLE SODIUM 40 MILLIGRAM(S): 20 TABLET, DELAYED RELEASE ORAL at 06:41

## 2020-10-06 RX ADMIN — LORATADINE 10 MILLIGRAM(S): 10 TABLET ORAL at 11:31

## 2020-10-06 RX ADMIN — Medication 20 MILLIGRAM(S): at 05:19

## 2020-10-06 RX ADMIN — ATORVASTATIN CALCIUM 80 MILLIGRAM(S): 80 TABLET, FILM COATED ORAL at 21:43

## 2020-10-06 RX ADMIN — Medication 100 MILLIGRAM(S): at 23:11

## 2020-10-06 RX ADMIN — Medication 1000 MILLIGRAM(S): at 01:30

## 2020-10-06 RX ADMIN — Medication 100 MILLIGRAM(S): at 17:12

## 2020-10-06 RX ADMIN — OXYCODONE AND ACETAMINOPHEN 2 TABLET(S): 5; 325 TABLET ORAL at 06:41

## 2020-10-06 RX ADMIN — FENTANYL CITRATE 25 MICROGRAM(S): 50 INJECTION INTRAVENOUS at 03:23

## 2020-10-06 RX ADMIN — Medication 10 MILLIGRAM(S): at 20:12

## 2020-10-06 RX ADMIN — Medication 3 MILLILITER(S): at 00:33

## 2020-10-06 RX ADMIN — Medication 400 MILLIGRAM(S): at 01:16

## 2020-10-06 RX ADMIN — Medication 100 MILLIGRAM(S): at 07:02

## 2020-10-06 RX ADMIN — HEPARIN SODIUM 5000 UNIT(S): 5000 INJECTION INTRAVENOUS; SUBCUTANEOUS at 21:42

## 2020-10-06 RX ADMIN — Medication 2 UNIT(S): at 08:02

## 2020-10-06 RX ADMIN — POLYETHYLENE GLYCOL 3350 17 GRAM(S): 17 POWDER, FOR SOLUTION ORAL at 11:35

## 2020-10-06 RX ADMIN — FENTANYL CITRATE 25 MICROGRAM(S): 50 INJECTION INTRAVENOUS at 03:08

## 2020-10-06 RX ADMIN — INSULIN GLARGINE 10 UNIT(S): 100 INJECTION, SOLUTION SUBCUTANEOUS at 21:44

## 2020-10-06 RX ADMIN — OXYCODONE AND ACETAMINOPHEN 2 TABLET(S): 5; 325 TABLET ORAL at 14:14

## 2020-10-06 NOTE — PHYSICAL THERAPY INITIAL EVALUATION ADULT - GAIT DEVIATIONS NOTED, PT EVAL
fairly steady, no loss of balance, moderate HE, respiratory splinting, unable to tolerate further activity due to c/o pain, multiple standing rest breaks

## 2020-10-06 NOTE — PROGRESS NOTE ADULT - SUBJECTIVE AND OBJECTIVE BOX
CTICU  CRITICAL  CARE  attending     Hand off received 					   Pertinent clinical, laboratory, radiographic, hemodynamic, echocardiographic, respiratory data, microbiologic data and chart were reviewed and analyzed frequently throughout the course of the day and night    Patient seen and examined with CTS/ SH attending at bedside      65 years old female with DM, HLD, SVT s/p ablation, cardiomyopathy, paroxysmal VT, non-obstructive CAD, left adrenal mass, multinodular goiter, HFpEF (EF 65-70%) grade 1 diastolic dysfunction, and recent hospital admission for COVID (2020).   Patient was admitted to [facility] on 20 with CHF. MI was ruled out.  She had complaints of acute SOB that  came on "suddenly" and she was "gasping for air" with associated mild chest pain.   Nothing made her breathing better or worse. She was diuresed.   ECHO showed EF 65-70% and moderate to severe MR. 10/1/20 VAL was completed showing MV with severe eccentric jet directed anteriorly.   Patient then underwent diagnostic cardiac cath revealing non-obstructive minimal CAD and severe MR.   The Patient was  transferred to [facility] under the care of Dr. Orozco for mitral valve repair or replacement.  Patient currently feels well and denies any CP, palpitations, SOB, wheezing, abd pain, n/v/d/c, fevers or chills.       FAMILY HISTORY:  No pertinent family history  No significant family history    PAST MEDICAL & SURGICAL HISTORY:  Essential hypertension  HTN (hypertension)  Other specified cardiac dysrhythmias  SVT (supraventricular tachycardia) s/p ablation  Hyperlipidemia  Hyperlipidemia  Palpitations  Palpitations  Premature beats  PVC (premature ventricular contraction)        14 system review was unremarkable    Vital signs, hemodynamic and respiratory parameters were reviewed from the bedside nursing flow sheet.  ICU Vital Signs Last 24 Hrs  T(C): 36.3 (06 Oct 2020 17:20), Max: 36.6 (05 Oct 2020 21:24)  T(F): 97.4 (06 Oct 2020 17:20), Max: 97.8 (05 Oct 2020 21:24)  HR: 84 (06 Oct 2020 20:00) (74 - 86)  BP: 121/58 (06 Oct 2020 14:00) (69/43 - 124/58)  BP(mean): 81 (06 Oct 2020 14:00) (51 - 86)  ABP: 105/47 (06 Oct 2020 20:00) (79/49 - 156/83)  ABP(mean): 64 (06 Oct 2020 20:00) (60 - 113)  RR: 19 (06 Oct 2020 20:00) (12 - 35)  SpO2: 99% (06 Oct 2020 20:00) (93% - 100%)    Adult Advanced Hemodynamics Last 24 Hrs  CVP(mm Hg): 1 (06 Oct 2020 20:00) (-1 - 16)  CVP(cm H2O): --  CO: --  CI: --  PA: --  PA(mean): --  PCWP: --  SVR: --  SVRI: --  PVR: --  PVRI: --, ABG - ( 06 Oct 2020 11:10 )  pH, Arterial: 7.45  pH, Blood: x     /  pCO2: 33    /  pO2: 143   / HCO3: 23    / Base Excess: -0.8  /  SaO2: 99                Mode: standby    Intake and output was reviewed and the fluid balance was calculated  Daily     Daily Weight in k (06 Oct 2020 11:00)  I&O's Summary    05 Oct 2020 07:01  -  06 Oct 2020 07:00  --------------------------------------------------------  IN: 1645.4 mL / OUT: 2263 mL / NET: -617.6 mL    06 Oct 2020 07:01  -  06 Oct 2020 20:57  --------------------------------------------------------  IN: 733 mL / OUT: 1447 mL / NET: -714 mL        All lines and drain sites were assessed    Neuro: Follows commands. Moves all 4 extremities.  Neck: No JVD.  CVS: S1, S2, No S3.  Lungs: Good air entry bilaterally.  Abd: Soft. No tenderness. + Bowel sounds.  Vascular: + DP/PT.  Extremities: No edema.  Lymphatic: Normal.  Skin: No abnormalities.      labs  CBC Full  -  ( 06 Oct 2020 16:52 )  WBC Count : 15.47 K/uL  RBC Count : 3.17 M/uL  Hemoglobin : 9.8 g/dL  Hematocrit : 28.7 %  Platelet Count - Automated : 151 K/uL  Mean Cell Volume : 90.5 fl  Mean Cell Hemoglobin : 30.9 pg  Mean Cell Hemoglobin Concentration : 34.1 gm/dL  Auto Neutrophil # : x  Auto Lymphocyte # : x  Auto Monocyte # : x  Auto Eosinophil # : x  Auto Basophil # : x  Auto Neutrophil % : x  Auto Lymphocyte % : x  Auto Monocyte % : x  Auto Eosinophil % : x  Auto Basophil % : x    10    135  |  101  |  14  ----------------------------<  147<H>  4.0   |  23  |  0.65    Ca    8.3<L>      06 Oct 2020 16:52  Phos  3.9     10-  Mg     1.9     10-06    TPro  6.1  /  Alb  3.6  /  TBili  1.1  /  DBili  x   /  AST  47<H>  /  ALT  17  /  AlkPhos  70  10-    PT/INR - ( 06 Oct 2020 16:52 )   PT: 14.6 sec;   INR: 1.23          PTT - ( 06 Oct 2020 16:52 )  PTT:26.7 sec  The current medications were reviewed   MEDICATIONS  (STANDING):  aspirin enteric coated 81 milliGRAM(s) Oral daily  atorvastatin 80 milliGRAM(s) Oral at bedtime  ceFAZolin   IVPB 2000 milliGRAM(s) IV Intermittent every 8 hours  dextrose 5%. 1000 milliLiter(s) (50 mL/Hr) IV Continuous <Continuous>  dextrose 50% Injectable 50 milliLiter(s) IV Push every 15 minutes  dextrose 50% Injectable 25 milliLiter(s) IV Push every 15 minutes  heparin   Injectable 5000 Unit(s) SubCutaneous every 8 hours  insulin glargine Injectable (LANTUS) 10 Unit(s) SubCutaneous at bedtime  insulin lispro (HumaLOG) corrective regimen sliding scale   SubCutaneous Before meals and at bedtime  insulin lispro Injectable (HumaLOG) 2 Unit(s) SubCutaneous three times a day before meals  loratadine 10 milliGRAM(s) Oral daily  pantoprazole    Tablet 40 milliGRAM(s) Oral before breakfast  polyethylene glycol 3350 17 Gram(s) Oral daily  senna 2 Tablet(s) Oral at bedtime  sodium chloride 0.9%. 1000 milliLiter(s) (10 mL/Hr) IV Continuous <Continuous>    MEDICATIONS  (PRN):  acetaminophen   Tablet .. 650 milliGRAM(s) Oral every 6 hours PRN Temp greater or equal to 38C (100.4F), Mild Pain (1 - 3)  dextrose 40% Gel 15 Gram(s) Oral once PRN Blood Glucose LESS THAN 70 milliGRAM(s)/deciliter  glucagon  Injectable 1 milliGRAM(s) IntraMuscular once PRN Glucose LESS THAN 70 milligrams/deciliter  oxycodone    5 mG/acetaminophen 325 mG 1 Tablet(s) Oral every 6 hours PRN Moderate Pain (4 - 6)  oxycodone    5 mG/acetaminophen 325 mG 2 Tablet(s) Oral every 6 hours PRN Severe Pain (7 - 10)          65y old  Female who presented with CHF due to Mitral regurgitation   S/P Mitral valve repair  S/P mitral valve ring annuloplasty  Hemodynamically stable.  Good oxygenation.  Fair urine out put.  Overall doing well.      My plan includes :  BRONCHODILATORS.  Statin and Betablocker.  Close hemodynamic, ventilatory and drain monitoring and management  Monitor for arrhythmias and monitor parameters for organ perfusion  Monitor neurologic status  Monitor renal function.  Head of the bed should remain elevated to 45 deg .   Chest PT and IS will be encouraged  Monitor adequacy of oxygenation and ventilation and attempt to wean oxygen  Nutritional goals will be met using po eventually , ensure adequate caloric intake and monitor the same  Stress ulcer and VTE prophylaxis will be achieved    Glycemic control is satisfactory  Electrolytes have been repleted as necessary and wound care has been carried out. Pain control has been achieved.   Aggressive physical therapy and early mobility and ambulation goals will be met   The family was updated about the course and plan  CRITICAL CARE TIME SPENT in evaluation and management, reassessments, review and interpretation of labs and x-rays, ventilator and hemodynamic management, formulating a plan and coordinating care: ___30____ MIN.  Time does not include procedural time.  CTICU ATTENDING     					    Darrlel Hyman MD

## 2020-10-06 NOTE — PROGRESS NOTE ADULT - SUBJECTIVE AND OBJECTIVE BOX
INTERVAL HPI/OVERNIGHT EVENTS:    POD#1 MV ring repair   EF 65%    64yo Female Hx HLD, DM, SVT - prior ablation, cardiomyopathy, paroxysmal VT, nonobstructive CAD, left adrenal mass, multinodular goiter, CHF (chronic diastolic), recent COVID-19 infection (April 2020) presenting to [facility] 9/29 with acute on chronic CHF exacerbation    (+)CHF exacerbation - diuresis initiated   ECHO 9/30: EF 65%; mod-severe MR.   VAL 10/1: MV with severe eccentric jet directed anteriorly.    Cath: non-obstructive minimal CAD and severe MR.     transferred to [facility] for management and intervention    To OR 10/5:   intraop: 2L Crystalloid given; no intraop blood products     Extubated to HFNC approximately midnight and since titrated to Nasal cannula  hemodynamically stable; sinus rhythm    transitioned off insuliin infusion this am   no acute events reported overnight     some orthostasis symptoms with BP drop to SBP 80's when standing - d/w CTS - plan 1 U pRBC (Hct 23)    PMHx includes but is not limited to:   Essential hypertension  HTN (hypertension)  SVT (supraventricular tachycardia) s/p ablation  Hyperlipidemia    ICU Vital Signs Last 24 Hrs  T(C): 36 (06 Oct 2020 09:46), Max: 36.8 (05 Oct 2020 13:22)  T(F): 96.8 (06 Oct 2020 09:46), Max: 98.2 (05 Oct 2020 13:22)  HR: 84 (06 Oct 2020 11:25) (74 - 86) sinus  BP: 85/61 (06 Oct 2020 11:25) (69/43 - 124/58)  BP(mean): 57 (06 Oct 2020 11:25) (51 - 86)  ABP: 87/45 (06 Oct 2020 11:25) (79/49 - 156/83)  ABP(mean): 60 (06 Oct 2020 11:25) (60 - 113)  RR: 24 (06 Oct 2020 11:25) (12 - 29)  SpO2: 100% (06 Oct 2020 11:25) (93% - 100%) 6 L NC    Qtts: None     I&O's Summary    05 Oct 2020 07:01  -  06 Oct 2020 07:00  --------------------------------------------------------  IN: 1645.4 mL / OUT: 2263 mL / NET: -617.6 mL    06 Oct 2020 07:01  -  06 Oct 2020 12:07  --------------------------------------------------------  IN: 43 mL / OUT: 622 mL / NET: -579 mL    Physical Exam    Heart - regular (-)rub/gallop  Lungs - CTA anterior/posterior - no rhonchi/wheeze  Abd - (+)BS soft NTND (-)r/r/g  Ext - warm to touch; no cyanosis/clubbing  Chest - incision site clean and dry  Neuro - alert/oriented and interactive; non-focal   Skin - no rash     LABS:                        7.6    15.34 )-----------( 175      ( 06 Oct 2020 11:15 )             23.1     10-06    138  |  103  |  15  ----------------------------<  161<H>  4.2   |  23  |  0.67    Ca    8.7      06 Oct 2020 11:15  Phos  4.9     10-06  Mg     2.3     10-06    TPro  6.1  /  Alb  3.6  /  TBili  1.1  /  DBili  x   /  AST  47<H>  /  ALT  17  /  AlkPhos  70  10-06    PT/INR - ( 06 Oct 2020 11:15 )   PT: 15.0 sec;   INR: 1.26     PTT - ( 06 Oct 2020 11:15 )  PTT:37.7 sec    ABG - ( 06 Oct 2020 11:10 ) 7.45/33/143/99    RADIOLOGY & ADDITIONAL STUDIES: reviewed     Patient presenting with CHF exacerbation - severe Flail MR now POD#1 MV ring repair - doing well    1. CV  hemodynamically stable at this time  orthostasis sxs when standing - d/w CTS - plan transfuse 1 U pRBC (Hct 23)  ASA/statin  complete periop Abx prophylaxis   LA normal 1.2    2. Pulm   titrate off HFNC and to continue to titrate supplemental oxygen down as tolerated  maintain Sa02 93% or greater  OOB in chair/incentive spirometry; ambulation with staff assist post-transfusion  monitor chest tube output    recheck coags - ?elevated pTT - repeat 37  Bowel regimen   glycemic control - HgA1c 6.9    DVT and GI prophylaxis    d/w patient/staff and CTS    I have spent/provided stated minutes of critical care time to this patient: 90

## 2020-10-06 NOTE — DIETITIAN INITIAL EVALUATION ADULT. - ENERGY NEEDS
Ideal body weight used for calculations as pt >120% of IBW.   ABW 67.3kg, IBW 45kg, 149% IBW, ht 60", BMI 29   Nutrient needs based on [facility] standards of care for maintenance in adults, adjusted for post-op, CHF needs, age  Fluid per team

## 2020-10-06 NOTE — DIETITIAN INITIAL EVALUATION ADULT. - OTHER INFO
67F with a PMHx of HLD, DM (A1C 6.9), SVT s/p ablation, cardiomyopathy, paroxysmal VT, non-obstructive CAD, left adrenal mass, multinodular goiter, HFpEF (EF 65-70%) grade 1 diastolic dysfunction, and recent hospital admission for COVID (April 2020). Patient states she went to [facility] on 9/29/20 with complaints of acute SOB. Patient denies doing anything at the time of the SOB and that it came on "suddenly" and she was "gasping for air" with associated mild chest pain. Nothing made her breathing better or worse. Patient was BIBA to  and was found to have CHF exacerbation and was diuresed. ECHO completed on 9/30/20 showing EF 65-70% and moderate to severe MR. 10/1/20 VAL was completed showing MV with severe eccentric jet directed anteriorly. Patient then underwent diagnostic cardiac cath revealing non-obstructive minimal CAD and severe MR. Patient transferred to [facility] under the care of Dr. Orozco for surgical evaluation of mitral valve. She was deemed a good surgical candidate and is now s/p MVR on 10/5/20. Extubated this AM 10/6 to [facility], placed on DASH/TLC diet + Cst Cho no Snack, noted fair PO tolerance thus far but reports weakness s/p extubation/ sx. At home does not follow any dietary restrictions, no allergies noted, wt has been stable. Reinforced importance of DASH/TLC + Cst Cho diet for post-op healing, pt receptive. Denies n/v/d, still no BM post-op, skin with giovani score 12, surgical incision to chest. Continues to be managed/ monitored for post-op care at this time, will follow per protocol.

## 2020-10-06 NOTE — PHYSICAL THERAPY INITIAL EVALUATION ADULT - PERTINENT HX OF CURRENT PROBLEM, REHAB EVAL
65 year old female was BIBA to  and was found to have CHF exacerbation and was diuresed. Patient transferred to [facility] under the care of Dr. Orozco for surgical evaluation of mitral valve.

## 2020-10-07 LAB
APTT BLD: 30.2 SEC — SIGNIFICANT CHANGE UP (ref 27.5–35.5)
GAS PNL BLDA: SIGNIFICANT CHANGE UP
GLUCOSE BLDC GLUCOMTR-MCNC: 140 MG/DL — HIGH (ref 70–99)
GLUCOSE BLDC GLUCOMTR-MCNC: 147 MG/DL — HIGH (ref 70–99)
GLUCOSE BLDC GLUCOMTR-MCNC: 150 MG/DL — HIGH (ref 70–99)
GLUCOSE BLDC GLUCOMTR-MCNC: 179 MG/DL — HIGH (ref 70–99)
HCT VFR BLD CALC: 26.3 % — LOW (ref 34.5–45)
HGB BLD-MCNC: 8.9 G/DL — LOW (ref 11.5–15.5)
INR BLD: 1.14 — SIGNIFICANT CHANGE UP (ref 0.88–1.16)
LACTATE SERPL-SCNC: 0.9 MMOL/L — SIGNIFICANT CHANGE UP (ref 0.5–2)
MAGNESIUM SERPL-MCNC: 1.9 MG/DL — SIGNIFICANT CHANGE UP (ref 1.6–2.6)
MCHC RBC-ENTMCNC: 30.3 PG — SIGNIFICANT CHANGE UP (ref 27–34)
MCHC RBC-ENTMCNC: 33.8 GM/DL — SIGNIFICANT CHANGE UP (ref 32–36)
MCV RBC AUTO: 89.5 FL — SIGNIFICANT CHANGE UP (ref 80–100)
NRBC # BLD: 0 /100 WBCS — SIGNIFICANT CHANGE UP (ref 0–0)
PHOSPHATE SERPL-MCNC: 3.6 MG/DL — SIGNIFICANT CHANGE UP (ref 2.5–4.5)
PLATELET # BLD AUTO: 133 K/UL — LOW (ref 150–400)
PROTHROM AB SERPL-ACNC: 13.6 SEC — SIGNIFICANT CHANGE UP (ref 10.6–13.6)
RBC # BLD: 2.94 M/UL — LOW (ref 3.8–5.2)
RBC # FLD: 14.8 % — HIGH (ref 10.3–14.5)
WBC # BLD: 15.68 K/UL — HIGH (ref 3.8–10.5)
WBC # FLD AUTO: 15.68 K/UL — HIGH (ref 3.8–10.5)

## 2020-10-07 PROCEDURE — 71045 X-RAY EXAM CHEST 1 VIEW: CPT | Mod: 26,76

## 2020-10-07 PROCEDURE — 99291 CRITICAL CARE FIRST HOUR: CPT

## 2020-10-07 RX ORDER — FUROSEMIDE 40 MG
20 TABLET ORAL DAILY
Refills: 0 | Status: DISCONTINUED | OUTPATIENT
Start: 2020-10-07 | End: 2020-10-08

## 2020-10-07 RX ORDER — MAGNESIUM SULFATE 500 MG/ML
2 VIAL (ML) INJECTION ONCE
Refills: 0 | Status: COMPLETED | OUTPATIENT
Start: 2020-10-07 | End: 2020-10-07

## 2020-10-07 RX ORDER — SODIUM CHLORIDE 9 MG/ML
3 INJECTION INTRAMUSCULAR; INTRAVENOUS; SUBCUTANEOUS EVERY 8 HOURS
Refills: 0 | Status: DISCONTINUED | OUTPATIENT
Start: 2020-10-07 | End: 2020-10-09

## 2020-10-07 RX ORDER — POTASSIUM CHLORIDE 20 MEQ
10 PACKET (EA) ORAL DAILY
Refills: 0 | Status: DISCONTINUED | OUTPATIENT
Start: 2020-10-07 | End: 2020-10-08

## 2020-10-07 RX ORDER — ALBUMIN HUMAN 25 %
250 VIAL (ML) INTRAVENOUS
Refills: 0 | Status: COMPLETED | OUTPATIENT
Start: 2020-10-07 | End: 2020-10-07

## 2020-10-07 RX ORDER — ALBUMIN HUMAN 25 %
250 VIAL (ML) INTRAVENOUS ONCE
Refills: 0 | Status: COMPLETED | OUTPATIENT
Start: 2020-10-07 | End: 2020-10-07

## 2020-10-07 RX ORDER — METOPROLOL TARTRATE 50 MG
12.5 TABLET ORAL
Refills: 0 | Status: DISCONTINUED | OUTPATIENT
Start: 2020-10-07 | End: 2020-10-09

## 2020-10-07 RX ADMIN — POLYETHYLENE GLYCOL 3350 17 GRAM(S): 17 POWDER, FOR SOLUTION ORAL at 11:01

## 2020-10-07 RX ADMIN — ATORVASTATIN CALCIUM 80 MILLIGRAM(S): 80 TABLET, FILM COATED ORAL at 21:29

## 2020-10-07 RX ADMIN — SODIUM CHLORIDE 3 MILLILITER(S): 9 INJECTION INTRAMUSCULAR; INTRAVENOUS; SUBCUTANEOUS at 21:30

## 2020-10-07 RX ADMIN — PANTOPRAZOLE SODIUM 40 MILLIGRAM(S): 20 TABLET, DELAYED RELEASE ORAL at 07:01

## 2020-10-07 RX ADMIN — Medication 2: at 17:08

## 2020-10-07 RX ADMIN — Medication 2 UNIT(S): at 07:00

## 2020-10-07 RX ADMIN — LORATADINE 10 MILLIGRAM(S): 10 TABLET ORAL at 11:00

## 2020-10-07 RX ADMIN — Medication 125 MILLILITER(S): at 04:11

## 2020-10-07 RX ADMIN — INSULIN GLARGINE 10 UNIT(S): 100 INJECTION, SOLUTION SUBCUTANEOUS at 21:30

## 2020-10-07 RX ADMIN — OXYCODONE AND ACETAMINOPHEN 2 TABLET(S): 5; 325 TABLET ORAL at 02:32

## 2020-10-07 RX ADMIN — Medication 125 MILLILITER(S): at 07:00

## 2020-10-07 RX ADMIN — Medication 81 MILLIGRAM(S): at 11:00

## 2020-10-07 RX ADMIN — Medication 650 MILLIGRAM(S): at 21:51

## 2020-10-07 RX ADMIN — OXYCODONE AND ACETAMINOPHEN 2 TABLET(S): 5; 325 TABLET ORAL at 03:57

## 2020-10-07 RX ADMIN — Medication 100 MILLIGRAM(S): at 07:01

## 2020-10-07 RX ADMIN — HEPARIN SODIUM 5000 UNIT(S): 5000 INJECTION INTRAVENOUS; SUBCUTANEOUS at 13:36

## 2020-10-07 RX ADMIN — Medication 12.5 MILLIGRAM(S): at 18:08

## 2020-10-07 RX ADMIN — Medication 650 MILLIGRAM(S): at 22:50

## 2020-10-07 RX ADMIN — Medication 50 GRAM(S): at 03:43

## 2020-10-07 RX ADMIN — Medication 20 MILLIGRAM(S): at 11:00

## 2020-10-07 RX ADMIN — Medication 2 UNIT(S): at 17:08

## 2020-10-07 RX ADMIN — Medication 2 UNIT(S): at 11:01

## 2020-10-07 RX ADMIN — SODIUM CHLORIDE 3 MILLILITER(S): 9 INJECTION INTRAMUSCULAR; INTRAVENOUS; SUBCUTANEOUS at 13:33

## 2020-10-07 RX ADMIN — Medication 10 MILLIEQUIVALENT(S): at 11:00

## 2020-10-07 RX ADMIN — Medication 125 MILLILITER(S): at 03:08

## 2020-10-07 RX ADMIN — HEPARIN SODIUM 5000 UNIT(S): 5000 INJECTION INTRAVENOUS; SUBCUTANEOUS at 21:29

## 2020-10-07 RX ADMIN — HEPARIN SODIUM 5000 UNIT(S): 5000 INJECTION INTRAVENOUS; SUBCUTANEOUS at 07:01

## 2020-10-07 RX ADMIN — SENNA PLUS 2 TABLET(S): 8.6 TABLET ORAL at 21:29

## 2020-10-07 NOTE — PROGRESS NOTE ADULT - SUBJECTIVE AND OBJECTIVE BOX
Patient discussed on morning rounds with Dr. Orozco    Operation / Date: 10/5: mini MV repair. nl EF    SUBJECTIVE ASSESSMENT:  65y Female seen and examined sitting up in chair. Pain is well controlled. She has no complaints. +flatus, no BM.         Vital Signs Last 24 Hrs  T(C): 36.3 (07 Oct 2020 10:01), Max: 36.6 (06 Oct 2020 22:43)  T(F): 97.4 (07 Oct 2020 10:01), Max: 97.9 (06 Oct 2020 22:43)  HR: 100 (07 Oct 2020 13:16) (82 - 100)  BP: 154/63 (07 Oct 2020 13:16) (95/45 - 154/63)  BP(mean): 91 (07 Oct 2020 13:16) (65 - 91)  RR: 18 (07 Oct 2020 13:16) (17 - 35)  SpO2: 93% (07 Oct 2020 13:16) (93% - 100%)  I&O's Detail    06 Oct 2020 07:01  -  07 Oct 2020 07:00  --------------------------------------------------------  IN:    Albumin 5%  - 250 mL: 1000 mL    Insulin: 3 mL    IV PiggyBack: 100 mL    Lactated Ringers Bolus: 75 mL    PRBCs (Packed Red Blood Cells): 300 mL    sodium chloride 0.9%: 240 mL  Total IN: 1718 mL    OUT:    Bulb (mL): 210 mL    Chest Tube (mL): 312 mL    Indwelling Catheter - Urethral (mL): 1810 mL  Total OUT: 2332 mL    Total NET: -614 mL      07 Oct 2020 07:01  -  07 Oct 2020 13:59  --------------------------------------------------------  IN:    sodium chloride 0.9%: 10 mL  Total IN: 10 mL    OUT:    Bulb (mL): 10 mL    Chest Tube (mL): 30 mL    Indwelling Catheter - Urethral (mL): 80 mL    Voided (mL): 300 mL  Total OUT: 420 mL    Total NET: -410 mL          CHEST TUBE:  No   FAWAD DRAIN:  Yes  EPICARDIAL WIRES: Yes.  TIE DOWNS: Yes.  PERAZA: No    PHYSICAL EXAM:  Appearance: No acute distress.  Neurologic: AAOx3, no AMS or focal deficits.  Responds appropriately to verbal and physical stimuli; exhibits purposeful movement in all extremities.  Cardiovascular: RRR, no M/R/G  Respiratory: CTAB  Gastrointestinal:  Soft, non-tender, non-distended, + BS.	  Extremities: warm, well perfused. Trace peripheral edema.  Incision Sites: thoracotomy CDI    LABS:                        8.9    15.68 )-----------( 133      ( 07 Oct 2020 01:47 )             26.3       COUMADIN: No    PT/INR - ( 07 Oct 2020 01:47 )   PT: 13.6 sec;   INR: 1.14          PTT - ( 07 Oct 2020 01:47 )  PTT:30.2 sec    10-06    134<L>  |  101  |  15  ----------------------------<  158<H>  4.1   |  23  |  0.72    Ca    8.1<L>      06 Oct 2020 22:47  Phos  3.6     10-07  Mg     1.9     10-07    TPro  5.6<L>  /  Alb  2.8<L>  /  TBili  1.0  /  DBili  x   /  AST  38  /  ALT  16  /  AlkPhos  70  10-06          MEDICATIONS  (STANDING):  aspirin enteric coated 81 milliGRAM(s) Oral daily  atorvastatin 80 milliGRAM(s) Oral at bedtime  dextrose 5%. 1000 milliLiter(s) (50 mL/Hr) IV Continuous <Continuous>  dextrose 50% Injectable 50 milliLiter(s) IV Push every 15 minutes  furosemide    Tablet 20 milliGRAM(s) Oral daily  heparin   Injectable 5000 Unit(s) SubCutaneous every 8 hours  insulin glargine Injectable (LANTUS) 10 Unit(s) SubCutaneous at bedtime  insulin lispro (HumaLOG) corrective regimen sliding scale   SubCutaneous Before meals and at bedtime  insulin lispro Injectable (HumaLOG) 2 Unit(s) SubCutaneous three times a day before meals  loratadine 10 milliGRAM(s) Oral daily  metoprolol tartrate 12.5 milliGRAM(s) Oral <User Schedule>  pantoprazole    Tablet 40 milliGRAM(s) Oral before breakfast  polyethylene glycol 3350 17 Gram(s) Oral daily  potassium chloride    Tablet ER 10 milliEquivalent(s) Oral daily  senna 2 Tablet(s) Oral at bedtime  sodium chloride 0.9% lock flush 3 milliLiter(s) IV Push every 8 hours    MEDICATIONS  (PRN):  acetaminophen   Tablet .. 650 milliGRAM(s) Oral every 6 hours PRN Temp greater or equal to 38C (100.4F), Mild Pain (1 - 3)  glucagon  Injectable 1 milliGRAM(s) IntraMuscular once PRN Glucose LESS THAN 70 milligrams/deciliter  oxycodone    5 mG/acetaminophen 325 mG 1 Tablet(s) Oral every 6 hours PRN Moderate Pain (4 - 6)  oxycodone    5 mG/acetaminophen 325 mG 2 Tablet(s) Oral every 6 hours PRN Severe Pain (7 - 10)        RADIOLOGY & ADDITIONAL TESTS:

## 2020-10-07 NOTE — CHART NOTE - NSCHARTNOTEFT_GEN_A_CORE
CT removed, evan remains.  No air leak noted on CT prior to removal.  Occlusive dressing applied.  CXR no pneumothorax, F/U official reading.  Pt tolerated well.

## 2020-10-07 NOTE — PROGRESS NOTE ADULT - ASSESSMENT
A/P:  Ms. Peng is a 67 year old female with a PMH of HLD, DM, SVT s/p ablation, cardiomyopathy, non-obstructive CAD, left adrenal mass, multinodular goiter, HFpEF (EF 65-70%) grade 1 diastolic dysfunction, and recent hospital admission for COVID (April 2020) who presented to  hospital on 9/29/20 with CHF exacerbation work up revealed new severe MR with EF 65-70% and she was transferred to [facility] for cardiac surgery. At , VAL which showed MV with severe eccentric jet directed anteriorly and diagnostic cardiac cath revealing non-obstructive minimal CAD and severe MR. Patient transferred to [facility] on 10/2/20 under the care of Dr. Orozco for surgical evaluation of mitral valve. On 10/5/20, she underwent a minimally invasive mitral valve repair via right thoracotomy with Dr. Orozco. OR course was uncomplicated. On POD1, she was extubated to HFNC. She was orthostatic and received 1u PRBCs. She was weaned to NC. On POD2, she was delined. Chest tube was removed. She was transferred to the step down unit.    Neurovascular:   - No delirium.   - Pain well controlled with current regimen: PO Tylenol and Percocet      Cardiovascular: h/o SVT s/p ablation, HLD, non-obstructivfe CAD, HFpEF (EF 6550  -POD2 s/p mini MV repair.  -Continue aspirin 81 and Lopressor 12.5mg BID. Increase BB as HR and BP tolerate.   - Diuresis started with PO Lasix 20mg daily.    Respiratory:   - 02 Sat = 98% on RA.  -Encourage C+DB and Use of IS 10x / hr while awake.  -CXR stable s/p CT removal. Sergio remains.    GI:   -PPX with Protonix  -PO Diet  - + flatus, needs BM    Renal / :  -Monitor renal function. BUN/CR 15/0.72  -Monitor I/O's.  - Continue PO LAsix 20mg daily, standing K. replete electrolytes as needed.    Endocrine:    -A1c 6.9%. BS well controlled postop  -TSH.    Hematologic:  - H/H stable.  - Recieved 1u PRBCs on POD1    ID:  - afebrile. WBC 15.  -Observe for SIRS/Sepsis Syndrome.    Prophylaxis:  -DVT prophylaxis with 5000 SubQ Heparin q8h.  -SCD's    Disposition:  -9 LA step down  - home when ready

## 2020-10-07 NOTE — PROGRESS NOTE ADULT - SUBJECTIVE AND OBJECTIVE BOX
INTERVAL HPI/OVERNIGHT EVENTS:    POD#2 MV ring repair   EF 65%    64yo Female Hx HLD, DM, SVT - prior ablation, cardiomyopathy, paroxysmal VT, nonobstructive CAD, left adrenal mass, multinodular goiter, CHF (chronic diastolic), recent COVID-19 infection (April 2020) presenting to [facility] 9/29 with acute on chronic CHF exacerbation    (+)CHF exacerbation - diuresis initiated   ECHO 9/30: EF 65%; mod-severe MR.   VAL 10/1: MV with severe eccentric jet directed anteriorly.    Cath: non-obstructive minimal CAD and severe MR.     transferred to [facility] for management and intervention    To OR 10/5:   intraop: 2L Crystalloid given; no intraop blood products     Extubated to HFNC approximately midnight op night and since titrated to Nasal cannula - on 4L NC - to be titrated down (d/w staff)  hemodynamically stable; sinus rhythm  currently OOB in chair     no acute events reported overnight     PMHx includes but is not limited to:   Essential hypertension  HTN (hypertension)  SVT (supraventricular tachycardia) s/p ablation  Hyperlipidemia    ICU Vital Signs Last 24 Hrs  T(C): 35.7 (07 Oct 2020 05:01), Max: 36.6 (06 Oct 2020 22:43)  T(F): 96.3 (07 Oct 2020 05:01), Max: 97.9 (06 Oct 2020 22:43)  HR: 84 (07 Oct 2020 08:00) (79 - 87) sinus   BP: 95/45 (07 Oct 2020 03:00) (85/61 - 121/58)  BP(mean): 65 (07 Oct 2020 03:00) (57 - 81)  ABP: 120/56 (07 Oct 2020 08:00) (87/45 - 141/65)  ABP(mean): 78 (07 Oct 2020 08:00) (60 - 91)  RR: 23 (07 Oct 2020 08:00) (17 - 35)  SpO2: 99% (07 Oct 2020 08:00) (98% - 100%) on 4 L NC - dec to 2L now     Qtts: None     I&O's Summary    06 Oct 2020 07:01  -  07 Oct 2020 07:00  --------------------------------------------------------  IN: 1718 mL / OUT: 2332 mL / NET: -614 mL    07 Oct 2020 07:01  -  07 Oct 2020 08:49  --------------------------------------------------------  IN: 10 mL / OUT: 80 mL / NET: -70 mL    Physical Exam    Heart regular (-)rub/gallop  Lungs - dec BS bases - no rhonchi/wheeze  Abd - (+)BS soft NTND (-)r/r/g  Ext - warm to touch no cyanosis/clubbing  Neuro - alert/oriented and interactive - non focal   Skin - no rash     LABS:                        8.9    15.68 )-----------( 133      ( 07 Oct 2020 01:47 )             26.3     10-06    134<L>  |  101  |  15  ----------------------------<  158<H>  4.1   |  23  |  0.72    Ca    8.1<L>      06 Oct 2020 22:47  Phos  3.6     10-07  Mg     1.9     10-07    TPro  5.6<L>  /  Alb  2.8<L>  /  TBili  1.0  /  DBili  x   /  AST  38  /  ALT  16  /  AlkPhos  70  10-06    PT/INR - ( 07 Oct 2020 01:47 )   PT: 13.6 sec;   INR: 1.14     PTT - ( 07 Oct 2020 01:47 )  PTT:30.2 sec    ABG - ( 07 Oct 2020 01:43 ) 7.46/36/100/98    RADIOLOGY & ADDITIONAL STUDIES: reviewed     Patient presenting with CHF exacerbation - severe Flail MR now POD#2 MV ring repair - doing well    1. CV  hemodynamically stable at this time  monitor BP   ASA/statin  complete periop Abx prophylaxis   LA normal 0.9  start lasix 20mg qd    2. Pulm   titrate supplemental oxygen down to off - now on 2L and titrate off  maintain Sa02 93% or greater  OOB in chair/incentive spirometry; ambulation with staff assist post-transfusion  plan remove chest tube today    3. Endocrine  maintain glycemic control - HgA1c 6.9  Lantus/humalog/ISS  /148    bowel regimen - no BM recorded or reported     DVT and GI prophylaxis    anticipate transfer to floor today when bed available    d/w patient/staff and CTS    I have spent/provided stated minutes of critical care time to this patient: 60 min

## 2020-10-08 LAB
ANION GAP SERPL CALC-SCNC: 13 MMOL/L — SIGNIFICANT CHANGE UP (ref 5–17)
BUN SERPL-MCNC: 14 MG/DL — SIGNIFICANT CHANGE UP (ref 7–23)
CALCIUM SERPL-MCNC: 8.7 MG/DL — SIGNIFICANT CHANGE UP (ref 8.4–10.5)
CHLORIDE SERPL-SCNC: 103 MMOL/L — SIGNIFICANT CHANGE UP (ref 96–108)
CO2 SERPL-SCNC: 24 MMOL/L — SIGNIFICANT CHANGE UP (ref 22–31)
CREAT SERPL-MCNC: 0.68 MG/DL — SIGNIFICANT CHANGE UP (ref 0.5–1.3)
GLUCOSE BLDC GLUCOMTR-MCNC: 119 MG/DL — HIGH (ref 70–99)
GLUCOSE BLDC GLUCOMTR-MCNC: 125 MG/DL — HIGH (ref 70–99)
GLUCOSE BLDC GLUCOMTR-MCNC: 132 MG/DL — HIGH (ref 70–99)
GLUCOSE BLDC GLUCOMTR-MCNC: 169 MG/DL — HIGH (ref 70–99)
GLUCOSE BLDC GLUCOMTR-MCNC: 232 MG/DL — HIGH (ref 70–99)
GLUCOSE SERPL-MCNC: 122 MG/DL — HIGH (ref 70–99)
HCT VFR BLD CALC: 24.8 % — LOW (ref 34.5–45)
HGB BLD-MCNC: 8.2 G/DL — LOW (ref 11.5–15.5)
MAGNESIUM SERPL-MCNC: 2.2 MG/DL — SIGNIFICANT CHANGE UP (ref 1.6–2.6)
MCHC RBC-ENTMCNC: 30.3 PG — SIGNIFICANT CHANGE UP (ref 27–34)
MCHC RBC-ENTMCNC: 33.1 GM/DL — SIGNIFICANT CHANGE UP (ref 32–36)
MCV RBC AUTO: 91.5 FL — SIGNIFICANT CHANGE UP (ref 80–100)
NRBC # BLD: 0 /100 WBCS — SIGNIFICANT CHANGE UP (ref 0–0)
PLATELET # BLD AUTO: 153 K/UL — SIGNIFICANT CHANGE UP (ref 150–400)
POTASSIUM SERPL-MCNC: 3.9 MMOL/L — SIGNIFICANT CHANGE UP (ref 3.5–5.3)
POTASSIUM SERPL-SCNC: 3.9 MMOL/L — SIGNIFICANT CHANGE UP (ref 3.5–5.3)
RBC # BLD: 2.71 M/UL — LOW (ref 3.8–5.2)
RBC # FLD: 15.7 % — HIGH (ref 10.3–14.5)
SODIUM SERPL-SCNC: 140 MMOL/L — SIGNIFICANT CHANGE UP (ref 135–145)
WBC # BLD: 15.19 K/UL — HIGH (ref 3.8–10.5)
WBC # FLD AUTO: 15.19 K/UL — HIGH (ref 3.8–10.5)

## 2020-10-08 PROCEDURE — 71045 X-RAY EXAM CHEST 1 VIEW: CPT | Mod: 26

## 2020-10-08 PROCEDURE — 93306 TTE W/DOPPLER COMPLETE: CPT | Mod: 26

## 2020-10-08 RX ORDER — POTASSIUM CHLORIDE 20 MEQ
40 PACKET (EA) ORAL ONCE
Refills: 0 | Status: COMPLETED | OUTPATIENT
Start: 2020-10-08 | End: 2020-10-08

## 2020-10-08 RX ORDER — FUROSEMIDE 40 MG
20 TABLET ORAL DAILY
Refills: 0 | Status: DISCONTINUED | OUTPATIENT
Start: 2020-10-08 | End: 2020-10-09

## 2020-10-08 RX ORDER — FUROSEMIDE 40 MG
40 TABLET ORAL DAILY
Refills: 0 | Status: DISCONTINUED | OUTPATIENT
Start: 2020-10-08 | End: 2020-10-09

## 2020-10-08 RX ORDER — METOCLOPRAMIDE HCL 10 MG
10 TABLET ORAL ONCE
Refills: 0 | Status: COMPLETED | OUTPATIENT
Start: 2020-10-08 | End: 2020-10-08

## 2020-10-08 RX ADMIN — HEPARIN SODIUM 5000 UNIT(S): 5000 INJECTION INTRAVENOUS; SUBCUTANEOUS at 05:51

## 2020-10-08 RX ADMIN — HEPARIN SODIUM 5000 UNIT(S): 5000 INJECTION INTRAVENOUS; SUBCUTANEOUS at 22:12

## 2020-10-08 RX ADMIN — SODIUM CHLORIDE 3 MILLILITER(S): 9 INJECTION INTRAMUSCULAR; INTRAVENOUS; SUBCUTANEOUS at 14:53

## 2020-10-08 RX ADMIN — Medication 20 MILLIGRAM(S): at 05:51

## 2020-10-08 RX ADMIN — Medication 20 MILLIGRAM(S): at 15:12

## 2020-10-08 RX ADMIN — POLYETHYLENE GLYCOL 3350 17 GRAM(S): 17 POWDER, FOR SOLUTION ORAL at 12:57

## 2020-10-08 RX ADMIN — Medication 2 UNIT(S): at 12:57

## 2020-10-08 RX ADMIN — Medication 2: at 22:12

## 2020-10-08 RX ADMIN — ATORVASTATIN CALCIUM 80 MILLIGRAM(S): 80 TABLET, FILM COATED ORAL at 22:13

## 2020-10-08 RX ADMIN — Medication 12.5 MILLIGRAM(S): at 05:51

## 2020-10-08 RX ADMIN — Medication 40 MILLIEQUIVALENT(S): at 15:12

## 2020-10-08 RX ADMIN — Medication 2 UNIT(S): at 18:04

## 2020-10-08 RX ADMIN — PANTOPRAZOLE SODIUM 40 MILLIGRAM(S): 20 TABLET, DELAYED RELEASE ORAL at 05:51

## 2020-10-08 RX ADMIN — HEPARIN SODIUM 5000 UNIT(S): 5000 INJECTION INTRAVENOUS; SUBCUTANEOUS at 15:12

## 2020-10-08 RX ADMIN — SODIUM CHLORIDE 3 MILLILITER(S): 9 INJECTION INTRAMUSCULAR; INTRAVENOUS; SUBCUTANEOUS at 05:26

## 2020-10-08 RX ADMIN — INSULIN GLARGINE 10 UNIT(S): 100 INJECTION, SOLUTION SUBCUTANEOUS at 22:13

## 2020-10-08 RX ADMIN — Medication 10 MILLIGRAM(S): at 10:30

## 2020-10-08 RX ADMIN — Medication 2 UNIT(S): at 09:27

## 2020-10-08 RX ADMIN — Medication 12.5 MILLIGRAM(S): at 18:01

## 2020-10-08 RX ADMIN — LORATADINE 10 MILLIGRAM(S): 10 TABLET ORAL at 12:57

## 2020-10-08 RX ADMIN — Medication 81 MILLIGRAM(S): at 12:57

## 2020-10-08 RX ADMIN — SODIUM CHLORIDE 3 MILLILITER(S): 9 INJECTION INTRAMUSCULAR; INTRAVENOUS; SUBCUTANEOUS at 21:58

## 2020-10-08 NOTE — PROGRESS NOTE ADULT - SUBJECTIVE AND OBJECTIVE BOX
Patient discussed on morning rounds with Dr. Orozco    Operation / Date: 10/5/20 Mini MVR, EF nml    SUBJECTIVE ASSESSMENT:  65y Female seen and examined bedside. Patient is not offering any acute complaints. Using IS pulling 500cc. +BM today. Ambulating in the halls. Denies chest pain, SOB, palpitations, N/V/D, abdominal pain, dizziness, fever or chills.     Vital Signs Last 24 Hrs  T(C): 36.9 (08 Oct 2020 09:04), Max: 37.6 (07 Oct 2020 21:23)  T(F): 98.5 (08 Oct 2020 09:04), Max: 99.6 (07 Oct 2020 21:23)  HR: 90 (08 Oct 2020 09:54) (86 - 100)  BP: 100/57 (08 Oct 2020 09:54) (95/46 - 135/61)  BP(mean): 73 (08 Oct 2020 09:54) (67 - 88)  RR: 18 (08 Oct 2020 09:54) (18 - 20)  SpO2: 95% (08 Oct 2020 09:54) (88% - 98%)  I&O's Detail    07 Oct 2020 07:01  -  08 Oct 2020 07:00  --------------------------------------------------------  IN:    Oral Fluid: 60 mL    sodium chloride 0.9%: 10 mL  Total IN: 70 mL    OUT:    Bulb (mL): 60 mL    Chest Tube (mL): 30 mL    Indwelling Catheter - Urethral (mL): 80 mL    Voided (mL): 1580 mL  Total OUT: 1750 mL    Total NET: -1680 mL      08 Oct 2020 07:01  -  08 Oct 2020 13:38  --------------------------------------------------------  IN:  Total IN: 0 mL    OUT:    Bulb (mL): 30 mL  Total OUT: 30 mL    Total NET: -30 mL    CHEST TUBE:  No.   FAWAD DRAIN:  Yes  EPICARDIAL WIRES: Yes  TIE DOWNS: Yes  PERAZA: No.    PHYSICAL EXAM:    General: Patient lying comfortably in bed, no acute distress   Neurological: Alert and oriented. No focal neurological deficits   Cardiovascular: S1S2, RRR, no murmurs appreciated on exam   Respiratory: Clear to ausculation bilaterally, no wheezes, rales or rhonchi  Gastrointestinal: Abdomen soft, non tender, non distended   Extremities: Warm and well perfused. No peripheral edema or calf tenderness bilaterally  Vascular: Peripheral pulses palpable bilaterally  Incision Sites: mini right thoracotomy is c/d/i.     LABS:                        8.2    15.19 )-----------( 153      ( 08 Oct 2020 07:03 )             24.8       COUMADIN:  No.   PT/INR - ( 07 Oct 2020 01:47 )   PT: 13.6 sec;   INR: 1.14          PTT - ( 07 Oct 2020 01:47 )  PTT:30.2 sec    10-08    140  |  103  |  14  ----------------------------<  122<H>  3.9   |  24  |  0.68    Ca    8.7      08 Oct 2020 07:03  Phos  3.6     10-07  Mg     2.2     10-08    TPro  5.6<L>  /  Alb  2.8<L>  /  TBili  1.0  /  DBili  x   /  AST  38  /  ALT  16  /  AlkPhos  70  10-06      MEDICATIONS  (STANDING):  aspirin enteric coated 81 milliGRAM(s) Oral daily  atorvastatin 80 milliGRAM(s) Oral at bedtime  dextrose 5%. 1000 milliLiter(s) (50 mL/Hr) IV Continuous <Continuous>  dextrose 50% Injectable 50 milliLiter(s) IV Push every 15 minutes  furosemide    Tablet 20 milliGRAM(s) Oral daily  furosemide    Tablet 40 milliGRAM(s) Oral daily  heparin   Injectable 5000 Unit(s) SubCutaneous every 8 hours  insulin glargine Injectable (LANTUS) 10 Unit(s) SubCutaneous at bedtime  insulin lispro (HumaLOG) corrective regimen sliding scale   SubCutaneous Before meals and at bedtime  insulin lispro Injectable (HumaLOG) 2 Unit(s) SubCutaneous three times a day before meals  loratadine 10 milliGRAM(s) Oral daily  metoprolol tartrate 12.5 milliGRAM(s) Oral <User Schedule>  pantoprazole    Tablet 40 milliGRAM(s) Oral before breakfast  polyethylene glycol 3350 17 Gram(s) Oral daily  potassium chloride    Tablet ER 40 milliEquivalent(s) Oral once  senna 2 Tablet(s) Oral at bedtime  sodium chloride 0.9% lock flush 3 milliLiter(s) IV Push every 8 hours    MEDICATIONS  (PRN):  acetaminophen   Tablet .. 650 milliGRAM(s) Oral every 6 hours PRN Temp greater or equal to 38C (100.4F), Mild Pain (1 - 3)  glucagon  Injectable 1 milliGRAM(s) IntraMuscular once PRN Glucose LESS THAN 70 milligrams/deciliter  oxycodone    5 mG/acetaminophen 325 mG 1 Tablet(s) Oral every 6 hours PRN Moderate Pain (4 - 6)  oxycodone    5 mG/acetaminophen 325 mG 2 Tablet(s) Oral every 6 hours PRN Severe Pain (7 - 10)        RADIOLOGY & ADDITIONAL TESTS:  < from: Xray Chest 2 Views PA/Lat (10.03.20 @ 19:22) >  EXAM:  XR CHEST PA LAT 2V                          PROCEDURE DATE:  10/03/2020          INTERPRETATION:  Clinical History: Preop    Frontal examination of the chest demonstrates mild cardiomegaly. Small right effusion. Mild dextroscoliosis thoracicspine with degenerative changes.    IMPRESSION: Small right effusion    < end of copied text >

## 2020-10-08 NOTE — PROGRESS NOTE ADULT - ASSESSMENT
67 year old female with a PMH of HLD, DM, SVT s/p ablation, cardiomyopathy, non-obstructive CAD, left adrenal mass, multinodular goiter, HFpEF (EF 65-70%) grade 1 diastolic dysfunction, and recent hospital admission for COVID (April 2020) who presented to [facility] on 9/29/20 with CHF exacerbation work up revealed new severe MR with EF 65-70% and she was transferred to [facility] for cardiac surgery. At , VAL which showed MV with severe eccentric jet directed anteriorly and diagnostic cardiac cath revealing non-obstructive minimal CAD and severe MR. Patient transferred to [facility] on 10/2/20 under the care of Dr. Orozco for surgical evaluation of mitral valve. On 10/5/20, she underwent a minimally invasive mitral valve repair via right thoracotomy with Dr. Orozco. OR course was uncomplicated. On POD1, she was extubated to HFNC. She was orthostatic and received 1u PRBCs. She was weaned to NC. On POD2, she was delined. Chest tube was removed. She was transferred to the step down unit.    Neurovascular:   - No delirium.   - Pain well controlled with current regimen: PO Tylenol and Percocet    Cardiovascular: h/o SVT s/p ablation, HLD, nonobstructive CAD, HFpEF (EF 6550  -POD2 s/p mini MV repair.  -Continue aspirin 81 and Lopressor 12.5mg BID. Increase BB as HR and BP tolerate.   - Diuresis started with PO Lasix 20mg daily.    Respiratory:   - 02 Sat = 98% on RA.  -Encourage C+DB and Use of IS 10x / hr while awake.  -CXR stable s/p CT removal. Sergio remains.    GI:   -PPX with Protonix  -PO Diet  - + flatus, needs BM    Renal / :  -Monitor renal function. BUN/CR 15/0.72  -Monitor I/O's.  - Continue PO LAsix 20mg daily, standing K. replete electrolytes as needed.    Endocrine:    -A1c 6.9%. BS well controlled postop  -TSH.    Hematologic:  - H/H stable.  - Recieved 1u PRBCs on POD1    ID:  - afebrile. WBC 15.  -Observe for SIRS/Sepsis Syndrome.    Prophylaxis:  -DVT prophylaxis with 5000 SubQ Heparin q8h.  -SCD's    Disposition:  -9 LA step down  - home when ready   67 year old female with a PMH of HLD, DM, SVT s/p ablation, cardiomyopathy, non-obstructive CAD, left adrenal mass, multinodular goiter, HFpEF (EF 65-70%) grade 1 diastolic dysfunction, and recent hospital admission for COVID (April 2020) who presented to  hospital on 9/29/20 with CHF exacerbation work up revealed new severe MR with EF 65-70% and she was transferred to [facility] for cardiac surgery. At , VAL which showed MV with severe eccentric jet directed anteriorly and diagnostic cardiac cath revealing non-obstructive minimal CAD and severe MR. Patient transferred to [facility] on 10/2/20 under the care of Dr. Orozco, Cardiothoracic Surgery, for further workup and mngt. On 10/5/20, patient underwent a minimally invasive mitral valve repair via right thoracotomy. OR course was uncomplicated and patient was transferred to CTICU in stable condition.  On POD1, extubated to HFNC. Orthostatic and received 1u PRBCs with adequate response, and weaned from HFNC to NC. On POD2, delined and chest tube was removed uncomplicated. Deemed stable for transfer to stepdown unit. Today is POD3, planned for postop echo, pulmonary pushing and diuresis.  Plan:  Neurovascular:  No delirium.   - Pain well controlled with current regimen: PO Tylenol and Percocet     Cardiovascular: MR now POD3 s/p mini MV repair.  -Continue aspirin 81mg PO qd, metoprolol 12.5mg PO BID, atorvastatin 80mg PO qhs  - Continue diuresis with lasix 40mg in AM, and lasix 20mg in PM  - continue to replete electrolytes as indicated for K>4    Respiratory:   - 02 Sat = 98% on RA.  -Encourage C+DB and Use of IS 10x / hr while awake.  -CXR stable, repeat in AM.     GI:   -PPX with Protonix  -PO Diet  +BM postop    Renal / :  -Monitor renal function. BUN/CR 14/0.68  -Monitor I/O's.  - Continue lasix as stated above.     Endocrine:    -A1c 6.9. continue ISS with blood glucose goal <150  -TSH: 0.9    Hematologic:  - acute postop blood loss anemia, s/p 1uPRBC with adequate resposne  - continue to trend H&H 8.2/24.8 - low stable, no acute signs of bleeding.   - evan drain with 30cc output today.     ID:  - afebrile. WBC 15.  -Observe for SIRS/Sepsis Syndrome.    Prophylaxis:  -DVT prophylaxis with 5000 SubQ Heparin q8h.  -SCD's    Disposition:  - home likely tomorrow.

## 2020-10-09 ENCOUNTER — TRANSCRIPTION ENCOUNTER (OUTPATIENT)
Age: 65
End: 2020-10-09

## 2020-10-09 VITALS
HEART RATE: 104 BPM | OXYGEN SATURATION: 97 % | RESPIRATION RATE: 15 BRPM | DIASTOLIC BLOOD PRESSURE: 76 MMHG | SYSTOLIC BLOOD PRESSURE: 118 MMHG

## 2020-10-09 LAB
ANION GAP SERPL CALC-SCNC: 12 MMOL/L — SIGNIFICANT CHANGE UP (ref 5–17)
BUN SERPL-MCNC: 10 MG/DL — SIGNIFICANT CHANGE UP (ref 7–23)
CALCIUM SERPL-MCNC: 8.3 MG/DL — LOW (ref 8.4–10.5)
CHLORIDE SERPL-SCNC: 98 MMOL/L — SIGNIFICANT CHANGE UP (ref 96–108)
CO2 SERPL-SCNC: 25 MMOL/L — SIGNIFICANT CHANGE UP (ref 22–31)
CREAT SERPL-MCNC: 0.71 MG/DL — SIGNIFICANT CHANGE UP (ref 0.5–1.3)
GLUCOSE BLDC GLUCOMTR-MCNC: 111 MG/DL — HIGH (ref 70–99)
GLUCOSE BLDC GLUCOMTR-MCNC: 132 MG/DL — HIGH (ref 70–99)
GLUCOSE SERPL-MCNC: 166 MG/DL — HIGH (ref 70–99)
HCT VFR BLD CALC: 26 % — LOW (ref 34.5–45)
HGB BLD-MCNC: 8.6 G/DL — LOW (ref 11.5–15.5)
MCHC RBC-ENTMCNC: 30.7 PG — SIGNIFICANT CHANGE UP (ref 27–34)
MCHC RBC-ENTMCNC: 33.1 GM/DL — SIGNIFICANT CHANGE UP (ref 32–36)
MCV RBC AUTO: 92.9 FL — SIGNIFICANT CHANGE UP (ref 80–100)
NRBC # BLD: 0 /100 WBCS — SIGNIFICANT CHANGE UP (ref 0–0)
PLATELET # BLD AUTO: 197 K/UL — SIGNIFICANT CHANGE UP (ref 150–400)
POTASSIUM SERPL-MCNC: 3.7 MMOL/L — SIGNIFICANT CHANGE UP (ref 3.5–5.3)
POTASSIUM SERPL-SCNC: 3.7 MMOL/L — SIGNIFICANT CHANGE UP (ref 3.5–5.3)
RBC # BLD: 2.8 M/UL — LOW (ref 3.8–5.2)
RBC # FLD: 15.5 % — HIGH (ref 10.3–14.5)
SODIUM SERPL-SCNC: 135 MMOL/L — SIGNIFICANT CHANGE UP (ref 135–145)
WBC # BLD: 14.49 K/UL — HIGH (ref 3.8–10.5)
WBC # FLD AUTO: 14.49 K/UL — HIGH (ref 3.8–10.5)

## 2020-10-09 PROCEDURE — 71045 X-RAY EXAM CHEST 1 VIEW: CPT | Mod: 26

## 2020-10-09 RX ORDER — ATORVASTATIN CALCIUM 80 MG/1
1 TABLET, FILM COATED ORAL
Qty: 30 | Refills: 0
Start: 2020-10-09 | End: 2020-11-07

## 2020-10-09 RX ORDER — FUROSEMIDE 40 MG
1 TABLET ORAL
Qty: 30 | Refills: 0
Start: 2020-10-09 | End: 2020-11-07

## 2020-10-09 RX ORDER — LORATADINE 10 MG/1
1 TABLET ORAL
Qty: 0 | Refills: 0 | DISCHARGE

## 2020-10-09 RX ORDER — SENNA PLUS 8.6 MG/1
2 TABLET ORAL
Qty: 14 | Refills: 0
Start: 2020-10-09 | End: 2020-10-15

## 2020-10-09 RX ORDER — PANTOPRAZOLE SODIUM 20 MG/1
1 TABLET, DELAYED RELEASE ORAL
Qty: 30 | Refills: 0
Start: 2020-10-09 | End: 2020-11-07

## 2020-10-09 RX ORDER — ASPIRIN/CALCIUM CARB/MAGNESIUM 324 MG
1 TABLET ORAL
Qty: 30 | Refills: 0
Start: 2020-10-09 | End: 2020-11-07

## 2020-10-09 RX ORDER — METOPROLOL TARTRATE 50 MG
1 TABLET ORAL
Qty: 0 | Refills: 0 | DISCHARGE

## 2020-10-09 RX ORDER — ASPIRIN/CALCIUM CARB/MAGNESIUM 324 MG
1 TABLET ORAL
Qty: 0 | Refills: 0 | DISCHARGE

## 2020-10-09 RX ORDER — METOPROLOL TARTRATE 50 MG
0.5 TABLET ORAL
Qty: 30 | Refills: 0
Start: 2020-10-09 | End: 2020-11-07

## 2020-10-09 RX ORDER — METOCLOPRAMIDE HCL 10 MG
10 TABLET ORAL ONCE
Refills: 0 | Status: COMPLETED | OUTPATIENT
Start: 2020-10-09 | End: 2020-10-09

## 2020-10-09 RX ORDER — POTASSIUM CHLORIDE 20 MEQ
1 PACKET (EA) ORAL
Qty: 30 | Refills: 0
Start: 2020-10-09 | End: 2020-11-07

## 2020-10-09 RX ORDER — ATORVASTATIN CALCIUM 80 MG/1
1 TABLET, FILM COATED ORAL
Qty: 0 | Refills: 0 | DISCHARGE

## 2020-10-09 RX ORDER — LORATADINE 10 MG/1
1 TABLET ORAL
Qty: 0 | Refills: 0 | DISCHARGE
Start: 2020-10-09

## 2020-10-09 RX ORDER — ACETAMINOPHEN 500 MG
2 TABLET ORAL
Qty: 0 | Refills: 0 | DISCHARGE
Start: 2020-10-09

## 2020-10-09 RX ADMIN — HEPARIN SODIUM 5000 UNIT(S): 5000 INJECTION INTRAVENOUS; SUBCUTANEOUS at 05:36

## 2020-10-09 RX ADMIN — POLYETHYLENE GLYCOL 3350 17 GRAM(S): 17 POWDER, FOR SOLUTION ORAL at 11:00

## 2020-10-09 RX ADMIN — Medication 20 MILLIGRAM(S): at 05:37

## 2020-10-09 RX ADMIN — SODIUM CHLORIDE 3 MILLILITER(S): 9 INJECTION INTRAMUSCULAR; INTRAVENOUS; SUBCUTANEOUS at 05:38

## 2020-10-09 RX ADMIN — LORATADINE 10 MILLIGRAM(S): 10 TABLET ORAL at 11:00

## 2020-10-09 RX ADMIN — Medication 12.5 MILLIGRAM(S): at 05:36

## 2020-10-09 RX ADMIN — Medication 650 MILLIGRAM(S): at 11:49

## 2020-10-09 RX ADMIN — Medication 40 MILLIGRAM(S): at 05:37

## 2020-10-09 RX ADMIN — Medication 2 UNIT(S): at 12:19

## 2020-10-09 RX ADMIN — Medication 81 MILLIGRAM(S): at 11:00

## 2020-10-09 RX ADMIN — Medication 10 MILLIGRAM(S): at 10:46

## 2020-10-09 RX ADMIN — Medication 650 MILLIGRAM(S): at 10:46

## 2020-10-09 NOTE — DISCHARGE NOTE PROVIDER - NSDCACTIVITY_GEN_ALL_CORE
Showering allowed/Walking - Indoors allowed/No heavy lifting/straining/Walking - Outdoors allowed/Do not drive or operate machinery/Stairs allowed
Patient

## 2020-10-09 NOTE — DISCHARGE NOTE NURSING/CASE MANAGEMENT/SOCIAL WORK - NSDCFUADDAPPT_GEN_ALL_CORE_FT
-Please follow up with Dr. Orozco on 10/20/2020 at 11:45AM.  The office is located at [facility], [facility], 4th floor. Call us with any questions #[phone].    - Please follow up with your cardiologist Dr. Lux on 10/27/20 at 9:45AM. Address is [address] [place] [address] [place], 1st floor. Phone number is #[phone].  - Please follow up with your PCP, Dr. Keane on 10/16/20 at 3:50pm. You were aware of your previous diagnosis of prediabetes. Your A1C is now 6.9. It is extremely important to attend this follow up appointment to guide your recovery from surgery and for better blood sugar control.

## 2020-10-09 NOTE — DISCHARGE NOTE PROVIDER - NSDCFUADDINST_GEN_ALL_CORE_FT
-Walk daily as tolerated and use your incentive spirometer every hour.    -No driving or strenuous activity/exercise for 6 weeks, or until  cleared by your surgeon.    -Gently clean your incisions with anti-bacterial soap and water, pat  dry.  You may leave them open to air.    -Call your doctor if you have shortness of breath, chest pain not  relieved by pain medication, dizziness, fever >101.5, or increased  redness or drainage from incisions.

## 2020-10-09 NOTE — DISCHARGE NOTE PROVIDER - NSDCFUADDAPPT_GEN_ALL_CORE_FT
-Please follow up with Dr. Orozco on ___.  The office is located at [facility], [facility], 4th floor. Call us with any questions  #[phone].    - Please follow up with your cardiologist Dr. Lux on __________________    -Walk daily as tolerated and use your incentive spirometer every hour.    -No driving or strenuous activity/exercise for 6 weeks, or until  cleared by your surgeon.    -Gently clean your incisions with anti-bacterial soap and water, pat  dry.  You may leave them open to air.    -Call your doctor if you have shortness of breath, chest pain not  relieved by pain medication, dizziness, fever >101.5, or increased  redness or drainage from incisions. -Please follow up with Dr. Orozco on 10/20/2020 at 11:45AM.  The office is located at [facility], [facility], 4th floor. Call us with any questions #[phone].    - Please follow up with your cardiologist Dr. Lux on 10/27/20 at 9:45AM. Address is [address] [place] [address], 1st floor. Phone number is #[phone].   -Please follow up with Dr. Orozco on 10/20/2020 at 11:45AM.  The office is located at [facility], [facility], 4th floor. Call us with any questions #[phone].    - Please follow up with your cardiologist Dr. Lux on 10/27/20 at 9:45AM. Address is [address] [place] [address], 1st floor. Phone number is #[phone].  - Please follow up with your PCP, Dr. Keane on ___ -Please follow up with Dr. Orozco on 10/20/2020 at 11:45AM.  The office is located at [facility], [facility], 4th floor. Call us with any questions #[phone].    - Please follow up with your cardiologist Dr. Lux on 10/27/20 at 9:45AM. Address is [address] [place] [address] [place], 1st floor. Phone number is #[phone].  - Please follow up with your PCP, Dr. Keane on 10/16/20 at 3:50pm. You were aware of your previous diagnosis of prediabetes. Your A1C is now 6.9. It is extremely important to attend this follow up appointment to guide your recovery from surgery and for better blood sugar control.

## 2020-10-09 NOTE — DISCHARGE NOTE PROVIDER - HOSPITAL COURSE
67 year old female with a PMH of HLD, DM, SVT s/p ablation, cardiomyopathy, non-obstructive CAD, left adrenal mass, multinodular goiter, HFpEF (EF 65-70%) grade 1 diastolic dysfunction, and recent hospital admission for COVID (April 2020) who presented to [facility] on 9/29/20 with CHF exacerbation. Work up at  included VAL which showed EF 65-70% MV with severe eccentric anteriorly directed jet as well as diagnostic cardiac cath revealing non-obstructive CAD. On 10/2/20 she was transferred to [facility] under the care of Dr. Orozco for surgical evaluation. Pt completed her pre-surgical work up and deemed a good surgical candidate. On 10/5/20, patient underwent a minimally invasive mitral valve repair via right thoracotomy. Operation was uncomplicated and patient was transferred to CTICU in stable condition.  On POD1, pt was extubated to HFNC and weaned to nasal cannula. She received 1u PRBC for symptomatic anemia and orthostasis with adequate response. On POD 2, pt was delined and chest tube was removed, CXR remained stable. She was deemed stable for transfer to stepdown unit. On POD 3, TTE demonstrated EF 60%, trace MR , no pericardial effusion. Pt was diuresed, pulomary toilet encouraged and she was ultimately weaned to room air. Today on POD 4 pain is well controlled, she is oxygenating well on room air, tolerating PO diet and moving her bowels. Per Dr. Angelo she is medically stable for discharge home.     35 minutes was spent with the patient reviewing the discharge material including medications, follow up appointments, recovery, concerning symptoms, and how to contact their health care providers if they have questions 67 year old female with a PMH of HLD, DM, SVT s/p ablation, cardiomyopathy, non-obstructive CAD, left adrenal mass, multinodular goiter, HFpEF (EF 65-70%) grade 1 diastolic dysfunction, and recent hospital admission for COVID (April 2020) who presented to [facility] on 9/29/20 with CHF exacerbation. Work up at  included VAL which showed EF 65-70% MV with severe eccentric anteriorly directed jet as well as diagnostic cardiac cath revealing non-obstructive CAD. On 10/2/20 she was transferred to [facility] under the care of Dr. Orozco for surgical evaluation. Pt completed her pre-surgical work up and deemed a good surgical candidate. On 10/5/20, patient underwent a minimally invasive mitral valve repair via right thoracotomy. OR uncomplicated and patient was transferred to CTICU in stable condition.  On POD1, pt was extubated to HFNC and weaned to nasal cannula. She received 1u PRBC for symptomatic anemia and orthostasis with adequate response. On POD 2, pt was delined and chest tube was removed, CXR remained stable. She was deemed stable for transfer to stepdown unit. On POD 3, TTE demonstrated EF 60%, trace MR , no pericardial effusion. Pt was diuresed, pulomary toilet encouraged and she was ultimately weaned to room air. Today on POD 4 pain is well controlled, she is oxygenating well on room air, tolerating PO diet and moving her bowels. Per Dr. Angelo she is medically stable for discharge home.     35 minutes was spent with the patient reviewing the discharge material including medications, follow up appointments, recovery, concerning symptoms, and how to contact their health care providers if they have questions

## 2020-10-09 NOTE — DISCHARGE NOTE PROVIDER - NSDCCPTREATMENT_GEN_ALL_CORE_FT
PRINCIPAL PROCEDURE  Procedure: Repair, mitral valve, with VAL  Findings and Treatment: EF nml

## 2020-10-09 NOTE — PROGRESS NOTE ADULT - SUBJECTIVE AND OBJECTIVE BOX
Patient discussed on morning rounds with       Operation / Date:     SUBJECTIVE ASSESSMENT:  65y Female seen and examined bedside. Patient reports feeling well and states she is ready to go home today. She states she is moving her bowels regularly, last BM was this AM. She is using IS and will bring it home to continue using it. Denies chest pain, SOB, palpitations, N/V/D, abdominal pain, dizziness, fever or chills. Patient is ambulating, tolerating PO diet, and voiding spontaneously.         Vital Signs Last 24 Hrs  T(C): 37.1 (09 Oct 2020 09:05), Max: 37.1 (08 Oct 2020 14:10)  T(F): 98.7 (09 Oct 2020 09:05), Max: 98.7 (08 Oct 2020 14:10)  HR: 96 (09 Oct 2020 08:45) (92 - 106)  BP: 124/58 (09 Oct 2020 08:45) (98/57 - 132/67)  BP(mean): 83 (09 Oct 2020 08:45) (69 - 92)  RR: 18 (09 Oct 2020 08:45) (14 - 18)  SpO2: 94% (09 Oct 2020 08:45) (92% - 95%)  I&O's Detail    08 Oct 2020 07:01  -  09 Oct 2020 07:00  --------------------------------------------------------  IN:    Oral Fluid: 250 mL  Total IN: 250 mL    OUT:    Bulb (mL): 70 mL  Total OUT: 70 mL    Total NET: 180 mL      CHEST TUBE: No.  FAWAD DRAIN:  No.  EPICARDIAL WIRES: No.  TIE DOWNS: Yes.  PERAZA: No.    PHYSICAL EXAM:    General:     Neurological:    Cardiovascular:    Respiratory:    Gastrointestinal:    Extremities:    Vascular:    Incision Sites:    LABS:                        8.6    14.49 )-----------( 197      ( 09 Oct 2020 11:00 )             26.0       COUMADIN:  Yes/No. REASON: .        10-09    135  |  98  |  10  ----------------------------<  166<H>  3.7   |  25  |  0.71    Ca    8.3<L>      09 Oct 2020 11:00  Mg     2.2     10-08      MEDICATIONS  (STANDING):  aspirin enteric coated 81 milliGRAM(s) Oral daily  atorvastatin 80 milliGRAM(s) Oral at bedtime  dextrose 5%. 1000 milliLiter(s) (50 mL/Hr) IV Continuous <Continuous>  dextrose 50% Injectable 50 milliLiter(s) IV Push every 15 minutes  furosemide    Tablet 20 milliGRAM(s) Oral daily  furosemide    Tablet 40 milliGRAM(s) Oral daily  heparin   Injectable 5000 Unit(s) SubCutaneous every 8 hours  insulin glargine Injectable (LANTUS) 10 Unit(s) SubCutaneous at bedtime  insulin lispro (HumaLOG) corrective regimen sliding scale   SubCutaneous Before meals and at bedtime  insulin lispro Injectable (HumaLOG) 2 Unit(s) SubCutaneous three times a day before meals  loratadine 10 milliGRAM(s) Oral daily  metoprolol tartrate 12.5 milliGRAM(s) Oral <User Schedule>  pantoprazole    Tablet 40 milliGRAM(s) Oral before breakfast  polyethylene glycol 3350 17 Gram(s) Oral daily  senna 2 Tablet(s) Oral at bedtime  sodium chloride 0.9% lock flush 3 milliLiter(s) IV Push every 8 hours    MEDICATIONS  (PRN):  acetaminophen   Tablet .. 650 milliGRAM(s) Oral every 6 hours PRN Temp greater or equal to 38C (100.4F), Mild Pain (1 - 3)  glucagon  Injectable 1 milliGRAM(s) IntraMuscular once PRN Glucose LESS THAN 70 milligrams/deciliter  oxycodone    5 mG/acetaminophen 325 mG 1 Tablet(s) Oral every 6 hours PRN Moderate Pain (4 - 6)  oxycodone    5 mG/acetaminophen 325 mG 2 Tablet(s) Oral every 6 hours PRN Severe Pain (7 - 10)        RADIOLOGY & ADDITIONAL TESTS:     Patient discussed on morning rounds with Dr. Angelo     Operation / Date: 10/5/20 Mini MVR, EF nml    SUBJECTIVE ASSESSMENT:  65y Female seen and examined bedside. Patient reports feeling well and states she is ready to go home today. She states she is moving her bowels regularly, last BM was this AM. She is using IS and will bring it home to continue using it. Denies chest pain, SOB, palpitations, N/V/D, abdominal pain, dizziness, fever or chills. Patient is ambulating, tolerating PO diet, and voiding spontaneously.     HOSPITAL COURSE:  65 year old female with a PMH of HLD, DM, SVT s/p ablation, cardiomyopathy, non-obstructive CAD, left adrenal mass, multinodular goiter, HFpEF (EF 65-70%) grade 1 diastolic dysfunction, and recent hospital admission for COVID (April 2020) who presented to [facility] on 9/29/20 with CHF exacerbation. Work up at  included VAL which showed EF 65-70% MV with severe eccentric anteriorly directed jet as well as diagnostic cardiac cath revealing non-obstructive CAD. On 10/2/20 she was transferred to [facility] under the care of Dr. Orozco for surgical evaluation. Pt completed her pre-surgical work up and deemed a good surgical candidate. On 10/5/20, patient underwent a minimally invasive mitral valve repair via right thoracotomy. OR uncomplicated and patient was transferred to CTICU in stable condition.  On POD1, pt was extubated to HFNC and weaned to nasal cannula. She received 1u PRBC for symptomatic anemia and orthostasis with adequate response. On POD 2, pt was delined and chest tube was removed, CXR remained stable. She was deemed stable for transfer to stepdown unit. On POD 3, TTE demonstrated EF 60%, trace MR , no pericardial effusion. Pt was diuresed, pulomary toilet encouraged and she was ultimately weaned to room air. Today on POD 4 pain is well controlled, she is oxygenating well on room air, tolerating PO diet and moving her bowels. Per Dr. Angelo she is medically stable for discharge home.     35 minutes was spent with the patient reviewing the discharge material including medications, follow up appointments, recovery, concerning symptoms, and how to contact their health care providers if they have questions    Vital Signs Last 24 Hrs  T(C): 37.1 (09 Oct 2020 09:05), Max: 37.1 (08 Oct 2020 14:10)  T(F): 98.7 (09 Oct 2020 09:05), Max: 98.7 (08 Oct 2020 14:10)  HR: 96 (09 Oct 2020 08:45) (92 - 106)  BP: 124/58 (09 Oct 2020 08:45) (98/57 - 132/67)  BP(mean): 83 (09 Oct 2020 08:45) (69 - 92)  RR: 18 (09 Oct 2020 08:45) (14 - 18)  SpO2: 94% (09 Oct 2020 08:45) (92% - 95%)  I&O's Detail    08 Oct 2020 07:01  -  09 Oct 2020 07:00  --------------------------------------------------------  IN:    Oral Fluid: 250 mL  Total IN: 250 mL    OUT:    Bulb (mL): 70 mL  Total OUT: 70 mL    Total NET: 180 mL      CHEST TUBE: No.  FAWAD DRAIN:  No.  EPICARDIAL WIRES: No.  TIE DOWNS: No  PERAZA: No.    PHYSICAL EXAM:    General: Patient lying comfortably in bed, no acute distress   Neurological: Alert and oriented. No focal neurological deficits   Cardiovascular: S1S2, RRR, no murmurs appreciated on exam   Respiratory: Clear to ausculation bilaterally, no wheezes, rales or rhonchi  Gastrointestinal: Abdomen soft, non tender, non distended   Extremities: Warm and well perfused. +1 peripheral edema b/l LE, no calf tenderness bilaterally  Vascular: Peripheral pulses palpable bilaterally  Incision Sites: mini right thoracotomy is c/d/i.       LABS:                        8.6    14.49 )-----------( 197      ( 09 Oct 2020 11:00 )             26.0       COUMADIN: No.     10-09    135  |  98  |  10  ----------------------------<  166<H>  3.7   |  25  |  0.71    Ca    8.3<L>      09 Oct 2020 11:00  Mg     2.2     10-08      MEDICATIONS  (STANDING):  aspirin enteric coated 81 milliGRAM(s) Oral daily  atorvastatin 80 milliGRAM(s) Oral at bedtime  dextrose 5%. 1000 milliLiter(s) (50 mL/Hr) IV Continuous <Continuous>  dextrose 50% Injectable 50 milliLiter(s) IV Push every 15 minutes  furosemide    Tablet 20 milliGRAM(s) Oral daily  furosemide    Tablet 40 milliGRAM(s) Oral daily  heparin   Injectable 5000 Unit(s) SubCutaneous every 8 hours  insulin glargine Injectable (LANTUS) 10 Unit(s) SubCutaneous at bedtime  insulin lispro (HumaLOG) corrective regimen sliding scale   SubCutaneous Before meals and at bedtime  insulin lispro Injectable (HumaLOG) 2 Unit(s) SubCutaneous three times a day before meals  loratadine 10 milliGRAM(s) Oral daily  metoprolol tartrate 12.5 milliGRAM(s) Oral <User Schedule>  pantoprazole    Tablet 40 milliGRAM(s) Oral before breakfast  polyethylene glycol 3350 17 Gram(s) Oral daily  senna 2 Tablet(s) Oral at bedtime  sodium chloride 0.9% lock flush 3 milliLiter(s) IV Push every 8 hours    MEDICATIONS  (PRN):  acetaminophen   Tablet .. 650 milliGRAM(s) Oral every 6 hours PRN Temp greater or equal to 38C (100.4F), Mild Pain (1 - 3)  glucagon  Injectable 1 milliGRAM(s) IntraMuscular once PRN Glucose LESS THAN 70 milligrams/deciliter  oxycodone    5 mG/acetaminophen 325 mG 1 Tablet(s) Oral every 6 hours PRN Moderate Pain (4 - 6)  oxycodone    5 mG/acetaminophen 325 mG 2 Tablet(s) Oral every 6 hours PRN Severe Pain (7 - 10)        RADIOLOGY & ADDITIONAL TESTS:    < from: Xray Chest 1 View- PORTABLE-Routine (Xray Chest 1 View- PORTABLE-Routine in AM.) (10.07.20 @ 02:42) >    EXAM:  XR CHEST PORTABLE ROUTINE 1V                          PROCEDURE DATE:  10/07/2020          INTERPRETATION:  Clinical History: Postop    Frontal examination the chest demonstrates cardiomegaly. No interval change position remaining support devices in comparison to prior examination of the chest 10/6/2020. Limited inspiration. Possible small left effusion. Right basilar infiltrate cannot be excluded    Impression: Limited inspiration. Possible small left effusion. Right basilar infiltrate cannot be excluded    < end of copied text >  < from: TTE Echo Complete w/o Contrast w/ Doppler (10.08.20 @ 10:39) >  CONCLUSIONS:     1. The mitral valve is minimally thickened. An annuloplasty ring is noted in the mitral position. There is trace mitral regurgitation.   2. There is mild tricuspid regurgitation. Pulmonary artery systolic pressure (estimated usingthe tricuspid regurgitant gradient and an estimate of right atrial pressure) is 33 mmHg.   3. The right ventricle is normal in size. Right ventricular systolic function is normal.   4. There is moderate concentric left ventricular hypertrophy. The left ventricle is normal in size and systolic function with a calculated ejection fraction of 60%.   5. Trivial pericardial effusion.   6. Compared to the previous TTE performed on 10/3/2020, patient is s/p mitral valve repair.    < end of copied text >

## 2020-10-09 NOTE — DISCHARGE NOTE PROVIDER - INSTRUCTIONS
Continue a diet low in sodium and low in cholesterol, otherwise known as a DASH Diet (Dietary Approaches to Stop Hypertension).  Eat more fruits, vegetables, and low-fat dairy foods. Cut back on foods that are high in saturated fat, cholesterol, and trans fats.

## 2020-10-09 NOTE — PROGRESS NOTE ADULT - ASSESSMENT
-Please follow up with Dr. Orozco on 10/20/2020 at 11:45AM.  The office is located at [facility], [facility], 4th floor. Call us with any questions #[phone].  - Please follow up with your cardiologist Dr. Lux on 10/27/20 at 9:45AM. Address is [address] [place], [place] [address] [place], 1st floor. Phone number is #[phone].  - Please follow up with your PCP, Dr. Keane on ___  Continue a diet low in sodium and low in cholesterol, otherwise known as a DASH Diet (Dietary Approaches to Stop Hypertension).  Eat more fruits, vegetables, and low-fat dairy foods. Cut back on foods that are high in saturated fat, cholesterol, and trans fats.  Do not drive or operate machinery, No heavy lifting/straining, Showering allowed, Stairs allowed, Walking - Indoors allowed, Walking - Outdoors allowed  -Walk daily as tolerated and use your incentive spirometer every hour.    -No driving or strenuous activity/exercise for 6 weeks, or until  cleared by your surgeon.    -Gently clean your incisions with anti-bacterial soap and water, pat  dry.  You may leave them open to air.    -Call your doctor if you have shortness of breath, chest pain not  relieved by pain medication, dizziness, fever >101.5, or increased  redness or drainage from incisions.

## 2020-10-09 NOTE — CHART NOTE - NSCHARTNOTEFT_GEN_A_CORE
Pt in NSR.  No bradycardic dysrhythmias.  Coagulation studies WNL.  Temporary pacing wires no longer necessary.  Wires removed with no incident.  CT stay sutures removed  Clean dressings applied  Patient tolerated well

## 2020-10-09 NOTE — DISCHARGE NOTE PROVIDER - NSDCMRMEDTOKEN_GEN_ALL_CORE_FT
anastrozole 1 mg oral tablet: 1 tab(s) orally once a day  aspirin 81 mg oral tablet: 1 tab(s) orally once a day  atorvastatin 80 mg oral tablet: 1 tab(s) orally once a day  loratadine 10 mg oral tablet: 1 tab(s) orally once a day  Metoprolol Tartrate 25 mg oral tablet: 1 tab(s) orally 2 times a day  Oysco 500 with D 500 mg-200 intl units (5 mcg) oral tablet: 1 tab(s) orally 2 times a day   acetaminophen 325 mg oral tablet: 2 tab(s) orally every 6 hours, As needed, Temp greater or equal to 38C (100.4F), Mild Pain (1 - 3)  anastrozole 1 mg oral tablet: 1 tab(s) orally once a day  loratadine 10 mg oral tablet: 1 tab(s) orally once a day  Oysco 500 with D 500 mg-200 intl units (5 mcg) oral tablet: 1 tab(s) orally 2 times a day   acetaminophen 325 mg oral tablet: 2 tab(s) orally every 6 hours, As needed, Temp greater or equal to 38C (100.4F), Mild Pain (1 - 3)  anastrozole 1 mg oral tablet: 1 tab(s) orally once a day  aspirin 81 mg oral delayed release tablet: 1 tab(s) orally once a day  atorvastatin 80 mg oral tablet: 1 tab(s) orally once a day (at bedtime)  furosemide 40 mg oral tablet: 1 tab(s) orally once a day  loratadine 10 mg oral tablet: 1 tab(s) orally once a day  metoprolol tartrate 25 mg oral tablet: 0.5 tab(s) orally every 12 hours   oxycodone-acetaminophen 5 mg-325 mg oral tablet: 1 tab(s) orally every 6 hours, As needed, Moderate to severe Pain (4 - 10) MDD:do not exceed 4 tabs per day  Oysco 500 with D 500 mg-200 intl units (5 mcg) oral tablet: 1 tab(s) orally 2 times a day  pantoprazole 40 mg oral delayed release tablet: 1 tab(s) orally once a day (before a meal)  potassium chloride 20 mEq oral tablet, extended release: 1 tab(s) orally once a day   senna oral tablet: 2 tab(s) orally once a day (at bedtime)

## 2020-10-09 NOTE — DISCHARGE NOTE PROVIDER - CARE PROVIDERS DIRECT ADDRESSES
[email or web address]

## 2020-10-09 NOTE — PROGRESS NOTE ADULT - REASON FOR ADMISSION
Mitral regurgitation

## 2020-10-09 NOTE — DISCHARGE NOTE PROVIDER - CARE PROVIDER_API CALL
Eren Orozco  CARDIOVASCULAR SURGERY  [address] [place] [address]  Phone: [phone]  Fax: [phone]  Follow Up Time:     Yusuf Lux)  Cardiovascular Disease; Internal Medicine; Interventional Cardiology  [address], [address]  [place] [address]  Phone: [phone]  Fax: [phone]  Follow Up Time:

## 2020-10-09 NOTE — DISCHARGE NOTE NURSING/CASE MANAGEMENT/SOCIAL WORK - PATIENT PORTAL LINK FT
You can access the FollowMyHealth Patient Portal offered by [facility] by registering at the following website: [email or web address]. By joining [employer]’s FollowMyHealth portal, you will also be able to view your health information using other applications (apps) compatible with our system.

## 2020-10-10 ENCOUNTER — TRANSCRIPTION ENCOUNTER (OUTPATIENT)
Age: 65
End: 2020-10-10

## 2020-10-12 LAB — SURGICAL PATHOLOGY STUDY: SIGNIFICANT CHANGE UP

## 2020-10-14 ENCOUNTER — APPOINTMENT (OUTPATIENT)
Dept: CARE COORDINATION | Facility: HOME HEALTH | Age: 65
End: 2020-10-14
Payer: MEDICAID

## 2020-10-14 DIAGNOSIS — Z09 ENCOUNTER FOR FOLLOW-UP EXAMINATION AFTER COMPLETED TREATMENT FOR CONDITIONS OTHER THAN MALIGNANT NEOPLASM: ICD-10-CM

## 2020-10-14 DIAGNOSIS — Z98.890 OTHER SPECIFIED POSTPROCEDURAL STATES: ICD-10-CM

## 2020-10-14 DIAGNOSIS — Z86.39 PERSONAL HISTORY OF OTHER ENDOCRINE, NUTRITIONAL AND METABOLIC DISEASE: ICD-10-CM

## 2020-10-14 DIAGNOSIS — I49.3 VENTRICULAR PREMATURE DEPOLARIZATION: ICD-10-CM

## 2020-10-14 DIAGNOSIS — Z00.00 ENCOUNTER FOR GENERAL ADULT MEDICAL EXAMINATION W/OUT ABNORMAL FINDINGS: ICD-10-CM

## 2020-10-14 DIAGNOSIS — I49.49 OTHER PREMATURE DEPOLARIZATION: ICD-10-CM

## 2020-10-14 DIAGNOSIS — Z86.79 PERSONAL HISTORY OF OTHER DISEASES OF THE CIRCULATORY SYSTEM: ICD-10-CM

## 2020-10-14 DIAGNOSIS — I34.0 NONRHEUMATIC MITRAL (VALVE) INSUFFICIENCY: ICD-10-CM

## 2020-10-14 PROCEDURE — 99024 POSTOP FOLLOW-UP VISIT: CPT

## 2020-10-14 RX ORDER — ACETAMINOPHEN 325 MG/1
325 TABLET ORAL EVERY 6 HOURS
Refills: 0 | Status: ACTIVE | COMMUNITY
Start: 2020-10-14

## 2020-10-14 RX ORDER — PANTOPRAZOLE 40 MG/1
40 TABLET, DELAYED RELEASE ORAL DAILY
Qty: 30 | Refills: 0 | Status: ACTIVE | COMMUNITY
Start: 2020-10-14

## 2020-10-14 RX ORDER — SENNOSIDES 8.6 MG/1
8.6 CAPSULE, GELATIN COATED ORAL
Refills: 0 | Status: ACTIVE | COMMUNITY
Start: 2020-10-14

## 2020-10-14 RX ORDER — OXYCODONE AND ACETAMINOPHEN 5; 325 MG/1; MG/1
5-325 TABLET ORAL
Refills: 0 | Status: ACTIVE | COMMUNITY
Start: 2020-10-14

## 2020-10-14 RX ORDER — POTASSIUM CHLORIDE 1500 MG/1
20 TABLET, EXTENDED RELEASE ORAL DAILY
Qty: 90 | Refills: 3 | Status: ACTIVE | COMMUNITY
Start: 2020-10-14

## 2020-10-14 RX ORDER — FUROSEMIDE 40 MG/1
40 TABLET ORAL DAILY
Qty: 30 | Refills: 1 | Status: ACTIVE | COMMUNITY
Start: 2020-10-14

## 2020-10-15 VITALS
RESPIRATION RATE: 18 BRPM | HEART RATE: 70 BPM | SYSTOLIC BLOOD PRESSURE: 109 MMHG | DIASTOLIC BLOOD PRESSURE: 71 MMHG | BODY MASS INDEX: 26.4 KG/M2 | WEIGHT: 149 LBS | HEIGHT: 63 IN | TEMPERATURE: 97.9 F | OXYGEN SATURATION: 98 %

## 2020-10-15 DIAGNOSIS — I50.32 CHRONIC DIASTOLIC (CONGESTIVE) HEART FAILURE: ICD-10-CM

## 2020-10-15 DIAGNOSIS — E27.8 OTHER SPECIFIED DISORDERS OF ADRENAL GLAND: ICD-10-CM

## 2020-10-15 DIAGNOSIS — E04.2 NONTOXIC MULTINODULAR GOITER: ICD-10-CM

## 2020-10-15 DIAGNOSIS — U07.1 COVID-19: ICD-10-CM

## 2020-10-15 DIAGNOSIS — Z86.79 PERSONAL HISTORY OF OTHER DISEASES OF THE CIRCULATORY SYSTEM: ICD-10-CM

## 2020-10-15 PROBLEM — Z98.890 S/P MITRAL VALVE REPAIR: Status: ACTIVE | Noted: 2020-10-13

## 2020-10-15 PROBLEM — I34.0 MITRAL REGURGITATION: Status: ACTIVE | Noted: 2020-10-13

## 2020-10-15 PROBLEM — Z09 POSTOP CHECK: Status: ACTIVE | Noted: 2020-10-13

## 2020-10-15 RX ORDER — ANASTROZOLE TABLETS 1 MG/1
1 TABLET ORAL DAILY
Refills: 0 | Status: ACTIVE | COMMUNITY
Start: 2020-10-14

## 2020-10-16 DIAGNOSIS — E78.5 HYPERLIPIDEMIA, UNSPECIFIED: ICD-10-CM

## 2020-10-16 DIAGNOSIS — I95.1 ORTHOSTATIC HYPOTENSION: ICD-10-CM

## 2020-10-16 DIAGNOSIS — I11.0 HYPERTENSIVE HEART DISEASE WITH HEART FAILURE: ICD-10-CM

## 2020-10-16 DIAGNOSIS — E87.2 ACIDOSIS: ICD-10-CM

## 2020-10-16 DIAGNOSIS — I34.0 NONRHEUMATIC MITRAL (VALVE) INSUFFICIENCY: ICD-10-CM

## 2020-10-16 DIAGNOSIS — I51.1 RUPTURE OF CHORDAE TENDINEAE, NOT ELSEWHERE CLASSIFIED: ICD-10-CM

## 2020-10-16 DIAGNOSIS — D62 ACUTE POSTHEMORRHAGIC ANEMIA: ICD-10-CM

## 2020-10-16 DIAGNOSIS — Z86.19 PERSONAL HISTORY OF OTHER INFECTIOUS AND PARASITIC DISEASES: ICD-10-CM

## 2020-10-16 DIAGNOSIS — Z79.82 LONG TERM (CURRENT) USE OF ASPIRIN: ICD-10-CM

## 2020-10-16 DIAGNOSIS — E04.2 NONTOXIC MULTINODULAR GOITER: ICD-10-CM

## 2020-10-16 DIAGNOSIS — E11.65 TYPE 2 DIABETES MELLITUS WITH HYPERGLYCEMIA: ICD-10-CM

## 2020-10-16 DIAGNOSIS — I25.10 ATHEROSCLEROTIC HEART DISEASE OF NATIVE CORONARY ARTERY WITHOUT ANGINA PECTORIS: ICD-10-CM

## 2020-10-16 DIAGNOSIS — I42.9 CARDIOMYOPATHY, UNSPECIFIED: ICD-10-CM

## 2020-10-16 DIAGNOSIS — E66.9 OBESITY, UNSPECIFIED: ICD-10-CM

## 2020-10-16 DIAGNOSIS — I50.33 ACUTE ON CHRONIC DIASTOLIC (CONGESTIVE) HEART FAILURE: ICD-10-CM

## 2020-10-18 NOTE — PHYSICAL EXAM
[Sclera] : the sclera and conjunctiva were normal [Neck Appearance] : the appearance of the neck was normal [Apical Impulse] : the apical impulse was normal [] : no respiratory distress [Examination Of The Chest] : the chest was normal in appearance [Bowel Sounds] : normal bowel sounds [Breast Appearance] : normal in appearance [Skin Color & Pigmentation] : normal skin color and pigmentation [External Female Genitalia] : normal external genitalia [FreeTextEntry1] : MS C/D/I CT C/D/I

## 2020-10-18 NOTE — PROCEDURE
[FreeTextEntry1] : Recieved Mr. Peng in her bedroom.  Patient is appears generally well.  No distress noted.

## 2020-10-20 ENCOUNTER — OUTPATIENT (OUTPATIENT)
Dept: OUTPATIENT SERVICES | Facility: HOSPITAL | Age: 65
LOS: 1 days | End: 2020-10-20
Payer: COMMERCIAL

## 2020-10-20 ENCOUNTER — APPOINTMENT (OUTPATIENT)
Dept: CARDIOTHORACIC SURGERY | Facility: CLINIC | Age: 65
End: 2020-10-20
Payer: MEDICAID

## 2020-10-20 VITALS
HEIGHT: 63 IN | DIASTOLIC BLOOD PRESSURE: 65 MMHG | HEART RATE: 100 BPM | WEIGHT: 148 LBS | TEMPERATURE: 97.6 F | RESPIRATION RATE: 18 BRPM | OXYGEN SATURATION: 98 % | SYSTOLIC BLOOD PRESSURE: 111 MMHG | BODY MASS INDEX: 26.22 KG/M2

## 2020-10-20 PROCEDURE — 71046 X-RAY EXAM CHEST 2 VIEWS: CPT

## 2020-10-20 PROCEDURE — 71046 X-RAY EXAM CHEST 2 VIEWS: CPT | Mod: 26

## 2020-10-20 PROCEDURE — 99024 POSTOP FOLLOW-UP VISIT: CPT

## 2020-10-29 PROCEDURE — 85610 PROTHROMBIN TIME: CPT

## 2020-10-29 PROCEDURE — 85025 COMPLETE CBC W/AUTO DIFF WBC: CPT

## 2020-10-29 PROCEDURE — 82962 GLUCOSE BLOOD TEST: CPT

## 2020-10-29 PROCEDURE — 88305 TISSUE EXAM BY PATHOLOGIST: CPT

## 2020-10-29 PROCEDURE — 84132 ASSAY OF SERUM POTASSIUM: CPT

## 2020-10-29 PROCEDURE — 86923 COMPATIBILITY TEST ELECTRIC: CPT

## 2020-10-29 PROCEDURE — P9016: CPT

## 2020-10-29 PROCEDURE — 36415 COLL VENOUS BLD VENIPUNCTURE: CPT

## 2020-10-29 PROCEDURE — 84100 ASSAY OF PHOSPHORUS: CPT

## 2020-10-29 PROCEDURE — 83036 HEMOGLOBIN GLYCOSYLATED A1C: CPT

## 2020-10-29 PROCEDURE — U0003: CPT

## 2020-10-29 PROCEDURE — 85730 THROMBOPLASTIN TIME PARTIAL: CPT

## 2020-10-29 PROCEDURE — 36430 TRANSFUSION BLD/BLD COMPNT: CPT

## 2020-10-29 PROCEDURE — 86901 BLOOD TYPING SEROLOGIC RH(D): CPT

## 2020-10-29 PROCEDURE — 93005 ELECTROCARDIOGRAM TRACING: CPT

## 2020-10-29 PROCEDURE — 82330 ASSAY OF CALCIUM: CPT

## 2020-10-29 PROCEDURE — 82803 BLOOD GASES ANY COMBINATION: CPT

## 2020-10-29 PROCEDURE — 94150 VITAL CAPACITY TEST: CPT

## 2020-10-29 PROCEDURE — 83605 ASSAY OF LACTIC ACID: CPT

## 2020-10-29 PROCEDURE — 86803 HEPATITIS C AB TEST: CPT

## 2020-10-29 PROCEDURE — 84484 ASSAY OF TROPONIN QUANT: CPT

## 2020-10-29 PROCEDURE — 80048 BASIC METABOLIC PNL TOTAL CA: CPT

## 2020-10-29 PROCEDURE — 86850 RBC ANTIBODY SCREEN: CPT

## 2020-10-29 PROCEDURE — 97161 PT EVAL LOW COMPLEX 20 MIN: CPT

## 2020-10-29 PROCEDURE — 85027 COMPLETE CBC AUTOMATED: CPT

## 2020-10-29 PROCEDURE — 94002 VENT MGMT INPAT INIT DAY: CPT

## 2020-10-29 PROCEDURE — 71046 X-RAY EXAM CHEST 2 VIEWS: CPT

## 2020-10-29 PROCEDURE — 94640 AIRWAY INHALATION TREATMENT: CPT

## 2020-10-29 PROCEDURE — 82550 ASSAY OF CK (CPK): CPT

## 2020-10-29 PROCEDURE — 80053 COMPREHEN METABOLIC PANEL: CPT

## 2020-10-29 PROCEDURE — 81001 URINALYSIS AUTO W/SCOPE: CPT

## 2020-10-29 PROCEDURE — 93880 EXTRACRANIAL BILAT STUDY: CPT

## 2020-10-29 PROCEDURE — 97116 GAIT TRAINING THERAPY: CPT

## 2020-10-29 PROCEDURE — P9045: CPT

## 2020-10-29 PROCEDURE — 86769 SARS-COV-2 COVID-19 ANTIBODY: CPT

## 2020-10-29 PROCEDURE — 84295 ASSAY OF SERUM SODIUM: CPT

## 2020-10-29 PROCEDURE — 83735 ASSAY OF MAGNESIUM: CPT

## 2020-10-29 PROCEDURE — C1889: CPT

## 2020-10-29 PROCEDURE — 86900 BLOOD TYPING SEROLOGIC ABO: CPT

## 2020-10-29 PROCEDURE — 84443 ASSAY THYROID STIM HORMONE: CPT

## 2020-10-29 PROCEDURE — 71045 X-RAY EXAM CHEST 1 VIEW: CPT

## 2020-10-29 PROCEDURE — 83880 ASSAY OF NATRIURETIC PEPTIDE: CPT

## 2020-10-29 PROCEDURE — 93306 TTE W/DOPPLER COMPLETE: CPT

## 2020-10-29 PROCEDURE — 82553 CREATINE MB FRACTION: CPT

## 2020-11-10 ENCOUNTER — TRANSCRIPTION ENCOUNTER (OUTPATIENT)
Age: 65
End: 2020-11-10

## 2021-02-04 ENCOUNTER — NON-APPOINTMENT (OUTPATIENT)
Age: 66
End: 2021-02-04

## 2021-10-13 ENCOUNTER — NON-APPOINTMENT (OUTPATIENT)
Age: 66
End: 2021-10-13

## 2021-10-19 ENCOUNTER — APPOINTMENT (OUTPATIENT)
Dept: CARDIOTHORACIC SURGERY | Facility: CLINIC | Age: 66
End: 2021-10-19

## 2022-12-13 ENCOUNTER — NON-APPOINTMENT (OUTPATIENT)
Age: 67
End: 2022-12-13